# Patient Record
Sex: MALE | Race: WHITE | NOT HISPANIC OR LATINO | Employment: FULL TIME | URBAN - METROPOLITAN AREA
[De-identification: names, ages, dates, MRNs, and addresses within clinical notes are randomized per-mention and may not be internally consistent; named-entity substitution may affect disease eponyms.]

---

## 2017-09-12 ENCOUNTER — OFFICE VISIT (OUTPATIENT)
Dept: URGENT CARE | Facility: URGENT CARE | Age: 32
End: 2017-09-12
Payer: COMMERCIAL

## 2017-09-12 VITALS
TEMPERATURE: 98.7 F | OXYGEN SATURATION: 96 % | DIASTOLIC BLOOD PRESSURE: 90 MMHG | WEIGHT: 247.5 LBS | HEART RATE: 76 BPM | BODY MASS INDEX: 34.52 KG/M2 | SYSTOLIC BLOOD PRESSURE: 120 MMHG

## 2017-09-12 DIAGNOSIS — R05.9 COUGH: ICD-10-CM

## 2017-09-12 DIAGNOSIS — J20.9 ACUTE BRONCHITIS WITH COEXISTING CONDITION REQUIRING PROPHYLACTIC TREATMENT: Primary | ICD-10-CM

## 2017-09-12 DIAGNOSIS — R09.89 CHEST CONGESTION: ICD-10-CM

## 2017-09-12 PROCEDURE — 99214 OFFICE O/P EST MOD 30 MIN: CPT | Performed by: PHYSICIAN ASSISTANT

## 2017-09-12 RX ORDER — CODEINE PHOSPHATE AND GUAIFENESIN 10; 100 MG/5ML; MG/5ML
5-10 SOLUTION ORAL
Qty: 120 ML | Refills: 0 | Status: SHIPPED | OUTPATIENT
Start: 2017-09-12 | End: 2018-08-20

## 2017-09-12 RX ORDER — AZITHROMYCIN 250 MG/1
TABLET, FILM COATED ORAL
Qty: 6 TABLET | Refills: 0 | Status: SHIPPED | OUTPATIENT
Start: 2017-09-12 | End: 2018-08-20

## 2017-09-12 NOTE — PROGRESS NOTES
SUBJECTIVE:   Timothy Castellanos is a 31 year old male presenting with a chief complaint of chest congestion, productive cough.  Onset of symptoms was 5 day(s) ago.  Course of illness is worsening.    Severity moderate  Current and Associated symptoms: productive cough, chest congestion  Treatment measures tried include OTC meds.  Predisposing factors include recent illness, worsening.    Past Medical History:   Diagnosis Date     C. difficile diarrhea  Feb 2016     Esophageal stricture 2001, 2007     GERD (gastroesophageal reflux disease)      Obesity (BMI 30-39.9)     BMI 33     Psoriasis        ALLERGIES   No Known Allergies      Social History   Substance Use Topics     Smoking status: Never Smoker     Smokeless tobacco: Never Used     Alcohol use No       ROS:  CONSTITUTIONAL:NEGATIVE for fever, chills, change in weight  INTEGUMENTARY/SKIN: NEGATIVE for worrisome rashes, moles or lesions  ENT/MOUTH: POSITIVE for purulent nasal drainage  RESP:POSITIVE for cough-productive and chest congestion  CV: NEGATIVE for chest pain, palpitations or peripheral edema  GI: NEGATIVE for nausea, abdominal pain, heartburn, or change in bowel habits  MUSCULOSKELETAL: NEGATIVE for significant arthralgias or myalgia  NEURO: NEGATIVE for weakness, dizziness or paresthesias    OBJECTIVE  :/90  Pulse 76  Temp 98.7  F (37.1  C) (Oral)  Wt 247 lb 8 oz (112.3 kg)  SpO2 96%  BMI 34.52 kg/m2  GENERAL APPEARANCE: healthy, alert and no distress  HENT: TM's normal bilaterally and rhinorrhea purulent  NECK: supple, nontender, no lymphadenopathy  RESP: lungs clear to auscultation - no rales, rhonchi or wheezes  CV: regular rates and rhythm, normal S1 S2, no murmur noted  NEURO: Normal strength and tone, sensory exam grossly normal,  normal speech and mentation  SKIN: no suspicious lesions or rashes      ASSESSMENT/PLAN:      ICD-10-CM    1. Acute bronchitis with coexisting condition requiring prophylactic treatment J20.9 azithromycin  (ZITHROMAX) 250 MG tablet     guaiFENesin-codeine (ROBITUSSIN AC) 100-10 MG/5ML SOLN solution   2. Cough R05 guaiFENesin-codeine (ROBITUSSIN AC) 100-10 MG/5ML SOLN solution   3. Chest congestion R09.89 guaiFENesin-codeine (ROBITUSSIN AC) 100-10 MG/5ML SOLN solution       Patient should take lactobacillus and/or take Activia yogurt while on antibiotics to help decrease the risk of Clostridium Difficile  Follow up with PCP as needed  See orders in Epic

## 2017-09-12 NOTE — MR AVS SNAPSHOT
After Visit Summary   9/12/2017    Timothy Castellanos    MRN: [id number]           Patient Information     Date Of Birth          1985        Visit Information        Provider Department      9/12/2017 9:10 AM Bc Burns PA-C [facility]        Today's Diagnoses     Acute bronchitis with coexisting condition requiring prophylactic treatment    -  1    Cough        Chest congestion           Follow-ups after your visit        Who to contact     If you have questions or need follow up information about today's clinic visit or your schedule please contact [facility] directly at [phone].  Normal or non-critical lab and imaging results will be communicated to you by [employer], letter or phone within 4 business days after the clinic has received the results. If you do not hear from us within 7 days, please contact the clinic through [employer] or phone. If you have a critical or abnormal lab result, we will notify you by phone as soon as possible.  Submit refill requests through [employer] or call your pharmacy and they will forward the refill request to us. Please allow 3 business days for your refill to be completed.          Additional Information About Your Visit        MyChart Information     [employer] gives you secure access to your electronic health record. If you see a primary care provider, you can also send messages to your care team and make appointments. If you have questions, please call your primary care clinic.  If you do not have a primary care provider, please call [phone] and they will assist you.        Care EveryWhere ID     This is your Care EveryWhere ID. This could be used by other organizations to access your [facility] medical records  [id number]        Your Vitals Were     Pulse Temperature Pulse Oximetry BMI (Body Mass Index)          76 98.7  F (37.1  C) (Oral) 96% 34.52 kg/m2         Blood Pressure from Last 3  Encounters:   09/12/17 120/90   03/11/16 128/86   02/12/16 120/72    Weight from Last 3 Encounters:   09/12/17 247 lb 8 oz (112.3 kg)   03/11/16 246 lb 1.6 oz (111.6 kg)   02/12/16 242 lb (109.8 kg)              Today, you had the following     No orders found for display         Today's Medication Changes          These changes are accurate as of: 9/12/17 11:11 AM.  If you have any questions, ask your nurse or doctor.               Start taking these medicines.        Dose/Directions    azithromycin 250 MG tablet   Commonly known as:  ZITHROMAX   Used for:  Acute bronchitis with coexisting condition requiring prophylactic treatment   Started by:  Bc Burns PA-C        2 tabs po qd day 1, then 1 tab po qd days 2-5   Quantity:  6 tablet   Refills:  0       guaiFENesin-codeine 100-10 MG/5ML Soln solution   Commonly known as:  ROBITUSSIN AC   Used for:  Acute bronchitis with coexisting condition requiring prophylactic treatment, Cough, Chest congestion   Started by:  Bc Burns PA-C        Dose:  5-10 mL   Take 5-10 mLs by mouth nightly as needed for cough   Quantity:  120 mL   Refills:  0            Where to get your medicines      These medications were sent to [facility] [address] [place] [address] AVE AT [place] [id number] [facility] [address]     Phone:  [phone]     azithromycin 250 MG tablet         Some of these will need a paper prescription and others can be bought over the counter.  Ask your nurse if you have questions.     Bring a paper prescription for each of these medications     guaiFENesin-codeine 100-10 MG/5ML Soln solution                Primary Care Provider Office Phone # Fax #    Prakash Castellanos [phone]       [address] [place] [address]        Equal Access to Services     ROSELINE HENRY AH: Dorothea Lambert, amarilis mabry, gordo fink  shannan smithminhdannielle pulliamAutumnaarochelle ah. So [facility] [phone].    ATENCIÓN: Si habla peter, tiene a swan disposición servicios gratuitos de asistencia lingüística. Nedra al [phone].    We comply with applicable federal civil rights laws and [place] laws. We do not discriminate on the basis of race, color, national origin, age, disability sex, sexual orientation or gender identity.            Thank you!     Thank you for choosing [facility]  for your care. Our goal is always to provide you with excellent care. Hearing back from our patients is one way we can continue to improve our services. Please take a few minutes to complete the written survey that you may receive in the mail after your visit with us. Thank you!             Your Updated Medication List - Protect others around you: Learn how to safely use, store and throw away your medicines at www.disposemymeds.org.          This list is accurate as of: 9/12/17 11:11 AM.  Always use your most recent med list.                   Brand Name Dispense Instructions for use Diagnosis    azithromycin 250 MG tablet    ZITHROMAX    6 tablet    2 tabs po qd day 1, then 1 tab po qd days 2-5    Acute bronchitis with coexisting condition requiring prophylactic treatment       guaiFENesin-codeine 100-10 MG/5ML Soln solution    ROBITUSSIN AC    120 mL    Take 5-10 mLs by mouth nightly as needed for cough    Acute bronchitis with coexisting condition requiring prophylactic treatment, Cough, Chest congestion

## 2017-09-12 NOTE — NURSING NOTE
"Chief Complaint   Patient presents with     Urgent Care     pt states fatigue, headaches, fever, body aches  4x days        Initial /90  Pulse 76  Temp 98.7  F (37.1  C) (Oral)  Wt 247 lb 8 oz (112.3 kg)  SpO2 96%  BMI 34.52 kg/m2 Estimated body mass index is 34.52 kg/(m^2) as calculated from the following:    Height as of 2/12/16: 5' 11\" (1.803 m).    Weight as of this encounter: 247 lb 8 oz (112.3 kg).  Medication Reconciliation: complete    "

## 2018-08-20 ENCOUNTER — OFFICE VISIT (OUTPATIENT)
Dept: INTERNAL MEDICINE | Facility: CLINIC | Age: 33
End: 2018-08-20
Payer: COMMERCIAL

## 2018-08-20 VITALS
HEIGHT: 72 IN | RESPIRATION RATE: 14 BRPM | BODY MASS INDEX: 32.05 KG/M2 | WEIGHT: 236.6 LBS | OXYGEN SATURATION: 99 % | SYSTOLIC BLOOD PRESSURE: 124 MMHG | TEMPERATURE: 98.6 F | HEART RATE: 69 BPM | DIASTOLIC BLOOD PRESSURE: 72 MMHG

## 2018-08-20 DIAGNOSIS — Z13.6 CARDIOVASCULAR SCREENING; LDL GOAL LESS THAN 160: ICD-10-CM

## 2018-08-20 DIAGNOSIS — Z00.00 ROUTINE GENERAL MEDICAL EXAMINATION AT A HEALTH CARE FACILITY: Primary | ICD-10-CM

## 2018-08-20 DIAGNOSIS — E66.9 OBESITY (BMI 30-39.9): ICD-10-CM

## 2018-08-20 PROCEDURE — 99395 PREV VISIT EST AGE 18-39: CPT | Performed by: INTERNAL MEDICINE

## 2018-08-20 NOTE — MR AVS SNAPSHOT
"              After Visit Summary   8/20/2018    Timothy Castellanos    MRN: [id number]           Patient Information     Date Of Birth          1985        Visit Information        Provider Department      8/20/2018 3:00 PM Prakash Castellanos MD [facility]        Today's Diagnoses     Routine general medical examination at a health care facility    -  1    Obesity (BMI 30-39.9)        CARDIOVASCULAR SCREENING; LDL GOAL LESS THAN 160          Care Instructions    * Return for a \"lab only appointment\" in the next couple of weeks.  Please get these labs done in a fasting state with nothing to eat for at least 8 hours prior to getting labs drawn.  I will make contact regarding the results and any recommendations once I have reviewed them.       5 GOALS TO PREVENT VASCULAR DISEASE:     1.  Aggressive blood pressure control, under 130/80 ideally.  Using medications if needed.    Your blood pressure is under good control    BP Readings from Last 4 Encounters:   08/20/18 124/72   09/12/17 120/90   03/11/16 128/86   02/12/16 120/72       2.  Aggressive LDL cholesterol (\"bad cholesterol\") lowering as indicated.    Your goal is an LDL under 130 for sure, preferably under 100.  (If you have diabetes or previous vascular disease, the the LDL goals would be under 100 for sure, preferably under 70.)    New guidelines identify four high-risk groups who could benefit from statins:   *people with pre-existing heart disease, such as those who have had a heart attack;   *people ages 40 to 75 who have diabetes of any type  *patients ages 40 to 75 with at least a 7.5% risk of developing cardiovascular disease over the next decade, according to a formula described in the guidelines  *patients with the sort of super-high cholesterol that sometimes runs in families, as evidenced by an LDL of 190 milligrams per deciliter or higher    Your cholesterol levels are well controlled.    Recent Labs   Lab Test  " "11/18/14   0853  08/14/13   0755   CHOL  197  211*   HDL  44  43   LDL  135*  152*   TRIG  89  78   CHOLHDLRATIO  4.5  4.9       3.  Aggressive diabetic prevention, screening and/or management.      You do not have diabetes as of the most recent blood tests.     4.  No smoking    5.  Consider taking low dose aspirin (81 mg) tablet once per day over the age of 50.           Preventive Health Recommendations  Male Ages 26 - 39    Yearly exam:             See your health care provider every year in order to  o   Review health changes.   o   Discuss preventive care.    o   Review your medicines if your doctor has prescribed any.    You should be tested each year for STDs (sexually transmitted diseases), if you re at risk.     After age 35, talk to your provider about cholesterol testing. If you are at risk for heart disease, have your cholesterol tested at least every 5 years.     If you are at risk for diabetes, you should have a diabetes test (fasting glucose).  Shots: Get a flu shot each year. Get a tetanus shot every 10 years.     Nutrition:    Eat at least 5 servings of fruits and vegetables daily.     Eat whole-grain bread, whole-wheat pasta and brown rice instead of white grains and rice.     Get adequate Calcium and Vitamin D.        --Good Grains:  Oats, brown rice, Quinoa (these do not raise the blood sugar as much)     --Bad grains:  Anything made from wheat or white rice     (because these raise the blood sugars significantly, and the possible gluten issue from wheat for some people).      --Proteins:  Aim for \"lean proteins\" including chicken, fish, seafood, pork, turkey, and eggs (in moderation); Eat red meat only occasionally          Se Ferro:                         Lifestyle    Exercise for at least 150 minutes a week (30 minutes a day, 5 days a week). This will help you control your weight and prevent disease.     Limit alcohol to one drink per day.     No smoking.     Wear sunscreen to prevent " "skin cancer.     See your dentist every six months for an exam and cleaning.             Follow-ups after your visit        Future tests that were ordered for you today     Open Future Orders        Priority Expected Expires Ordered    Lipid panel reflex to direct LDL Fasting Routine 9/3/2018 10/20/2018 8/20/2018    **CBC with platelets FUTURE 2mo Routine 9/3/2018 10/20/2018 8/20/2018    **Basic metabolic panel FUTURE 2mo Routine 9/3/2018 10/20/2018 8/20/2018            Who to contact     If you have questions or need follow up information about today's clinic visit or your schedule please contact [facility] directly at [phone].  Normal or non-critical lab and imaging results will be communicated to you by "[employer]hart, letter or phone within 4 business days after the clinic has received the results. If you do not hear from us within 7 days, please contact the clinic through "[employer]hart or phone. If you have a critical or abnormal lab result, we will notify you by phone as soon as possible.  Submit refill requests through [employer] or call your pharmacy and they will forward the refill request to us. Please allow 3 business days for your refill to be completed.          Additional Information About Your Visit        "[employer]harDue[employer]il Information     [employer] gives you secure access to your electronic health record. If you see a primary care provider, you can also send messages to your care team and make appointments. If you have questions, please call your primary care clinic.  If you do not have a primary care provider, please call [phone] and they will assist you.        Care EveryWhere ID     This is your Care EveryWhere ID. This could be used by other organizations to access your [facility] medical records  [id number]        Your Vitals Were     Pulse Temperature Respirations Height Pulse Oximetry BMI (Body Mass Index)    69 98.6  F (37  C) (Oral) 14 5' 11.5\" (1.816 m) 99% 32.54 kg/m2       Blood " Pressure from Last 3 Encounters:   08/20/18 124/72   09/12/17 120/90   03/11/16 128/86    Weight from Last 3 Encounters:   08/20/18 236 lb 9.6 oz (107.3 kg)   09/12/17 247 lb 8 oz (112.3 kg)   03/11/16 246 lb 1.6 oz (111.6 kg)               Primary Care Provider Office Phone # Fax #    Prakash Castellanos [phone]       [address] [place] [address]        Equal Access to Services     [facility]: Hadii aad ku hadasho Soomaali, waaxda luqadaha, qaybta kaalmada adeegyada, gordo kennedy . So [facility] [phone].    ATENCIÓN: Si habla español, tiene a swan disposición servicios gratuitos de asistencia lingüística. [facility] [phone].    We comply with applicable federal civil rights laws and [place] laws. We do not discriminate on the basis of race, color, national origin, age, disability, sex, sexual orientation, or gender identity.            Thank you!     Thank you for choosing [facility]  for your care. Our goal is always to provide you with excellent care. Hearing back from our patients is one way we can continue to improve our services. Please take a few minutes to complete the written survey that you may receive in the mail after your visit with us. Thank you!             Your Updated Medication List - Protect others around you: Learn how to safely use, store and throw away your medicines at www.disposemymeds.org.      Notice  As of 8/20/2018  3:47 PM    You have not been prescribed any medications.

## 2018-08-20 NOTE — PATIENT INSTRUCTIONS
"* Return for a \"lab only appointment\" in the next couple of weeks.  Please get these labs done in a fasting state with nothing to eat for at least 8 hours prior to getting labs drawn.  I will make contact regarding the results and any recommendations once I have reviewed them.       5 GOALS TO PREVENT VASCULAR DISEASE:     1.  Aggressive blood pressure control, under 130/80 ideally.  Using medications if needed.    Your blood pressure is under good control    BP Readings from Last 4 Encounters:   08/20/18 124/72   09/12/17 120/90   03/11/16 128/86   02/12/16 120/72       2.  Aggressive LDL cholesterol (\"bad cholesterol\") lowering as indicated.    Your goal is an LDL under 130 for sure, preferably under 100.  (If you have diabetes or previous vascular disease, the the LDL goals would be under 100 for sure, preferably under 70.)    New guidelines identify four high-risk groups who could benefit from statins:   *people with pre-existing heart disease, such as those who have had a heart attack;   *people ages 40 to 75 who have diabetes of any type  *patients ages 40 to 75 with at least a 7.5% risk of developing cardiovascular disease over the next decade, according to a formula described in the guidelines  *patients with the sort of super-high cholesterol that sometimes runs in families, as evidenced by an LDL of 190 milligrams per deciliter or higher    Your cholesterol levels are well controlled.    Recent Labs   Lab Test  11/18/14   0853  08/14/13   0755   CHOL  197  211*   HDL  44  43   LDL  135*  152*   TRIG  89  78   CHOLHDLRATIO  4.5  4.9       3.  Aggressive diabetic prevention, screening and/or management.      You do not have diabetes as of the most recent blood tests.     4.  No smoking    5.  Consider taking low dose aspirin (81 mg) tablet once per day over the age of 50.           Preventive Health Recommendations  Male Ages 26 - 39    Yearly exam:             See your health care provider every year in " "order to  o   Review health changes.   o   Discuss preventive care.    o   Review your medicines if your doctor has prescribed any.    You should be tested each year for STDs (sexually transmitted diseases), if you re at risk.     After age 35, talk to your provider about cholesterol testing. If you are at risk for heart disease, have your cholesterol tested at least every 5 years.     If you are at risk for diabetes, you should have a diabetes test (fasting glucose).  Shots: Get a flu shot each year. Get a tetanus shot every 10 years.     Nutrition:    Eat at least 5 servings of fruits and vegetables daily.     Eat whole-grain bread, whole-wheat pasta and brown rice instead of white grains and rice.     Get adequate Calcium and Vitamin D.        --Good Grains:  Oats, brown rice, Quinoa (these do not raise the blood sugar as much)     --Bad grains:  Anything made from wheat or white rice     (because these raise the blood sugars significantly, and the possible gluten issue from wheat for some people).      --Proteins:  Aim for \"lean proteins\" including chicken, fish, seafood, pork, turkey, and eggs (in moderation); Eat red meat only occasionally          Se Ferro:                         Lifestyle    Exercise for at least 150 minutes a week (30 minutes a day, 5 days a week). This will help you control your weight and prevent disease.     Limit alcohol to one drink per day.     No smoking.     Wear sunscreen to prevent skin cancer.     See your dentist every six months for an exam and cleaning.     "

## 2018-08-20 NOTE — PROGRESS NOTES
SUBJECTIVE:   CC: Timothy Castellanos is an 32 year old male who presents for preventative health visit.     Physical   Annual:     Getting at least 3 servings of Calcium per day:  Yes    Bi-annual eye exam:  Yes    Dental care twice a year:  NO    Sleep apnea or symptoms of sleep apnea:  Excessive snoring    Diet:  Regular (no restrictions)    Frequency of exercise:  1 day/week    Duration of exercise:  15-30 minutes    Taking medications regularly:  Yes    Medication side effects:  None    Additional concerns today:  YES (c/o acute aching pain in L shoulder/chest area that lasts for a few seconds then goes away. Pain can occur multiple tiems a day, typically when he is resting/laying down. Denies sweating and nausea. Pain does raditate down under arm)      Today's PHQ-2 Score:   PHQ-2 ( 1999 Pfizer) 8/20/2018   Q1: Little interest or pleasure in doing things 0   Q2: Feeling down, depressed or hopeless 0   PHQ-2 Score 0   Q1: Little interest or pleasure in doing things Not at all   Q2: Feeling down, depressed or hopeless Not at all   PHQ-2 Score 0       Abuse: Current or Past(Physical, Sexual or Emotional)- No  Do you feel safe in your environment - Yes    Social History   Substance Use Topics     Smoking status: Never Smoker     Smokeless tobacco: Never Used     Alcohol use No     Alcohol Use 8/20/2018   If you drink alcohol do you typically have greater than 3 drinks per day OR greater than 7 drinks per week? No       Last PSA: No results found for: PSA    Reviewed orders with patient. Reviewed health maintenance and updated orders accordingly - Yes      Reviewed and updated as needed this visit by clinical staff  Tobacco  Allergies  Meds         Reviewed and updated as needed this visit by Provider          Past Medical History:  ---------------------------  Past Medical History:   Diagnosis Date     C. difficile diarrhea  Feb 2016     Esophageal stricture 2001, 2007     GERD (gastroesophageal reflux disease)       Obesity (BMI 30-39.9)     BMI 33     Psoriasis        Past Surgical History:  ---------------------------  Past Surgical History:   Procedure Laterality Date     DENTAL SURGERY  2002    4 wisdom teeth removed without complications       Current Medications:  ---------------------------  No current outpatient prescriptions on file.       Allergies:  -------------  No Known Allergies    Social History:  -------------------  Social History     Social History     Marital status:      Spouse name: N/A     Number of children: N/A     Years of education: N/A     Occupational History     [employer]     Social History Main Topics     Smoking status: Never Smoker     Smokeless tobacco: Never Used     Alcohol use No     Drug use: No     Sexual activity: Yes     Partners: Female     Birth control/ protection: Surgical      Comment: Wife has tubes tied     Other Topics Concern     Not on file     Social History Narrative       Family Medical History:  ------------------------------  Family History   Problem Relation Age of Onset     Family History Negative Mother      Respiratory Father      sleep apnea     Family History Negative Brother      Family History Negative Brother         Review of Systems  CONSTITUTIONAL: NEGATIVE for fever, chills, change in weight  INTEGUMENTARY/SKIN: NEGATIVE for worrisome rashes, moles or lesions  EYES: NEGATIVE for vision changes or irritation  ENT: NEGATIVE for ear, mouth and throat problems  RESP: NEGATIVE for significant cough or SOB  CV: NEGATIVE for chest pain, palpitations or peripheral edema  GI: NEGATIVE for nausea, abdominal pain, heartburn, or change in bowel habits   male: negative for dysuria, hematuria, decreased urinary stream, erectile dysfunction, urethral discharge  MUSCULOSKELETAL: NEGATIVE for significant arthralgias or myalgia  NEURO: NEGATIVE for weakness, dizziness or paresthesias  PSYCHIATRIC: NEGATIVE for changes in mood or  "affect    OBJECTIVE:   /72  Pulse 69  Temp 98.6  F (37  C) (Oral)  Resp 14  Ht 5' 11.5\" (1.816 m)  Wt 236 lb 9.6 oz (107.3 kg)  SpO2 99%  BMI 32.54 kg/m2    Physical Exam    GENERAL alert and no distress.  EYES conjunctivae/corneas clear. PERRL, EOM's intact  HENT: NCAT,oral and posterior pharynx without lesions or erythema, facies symmetric  NECK: Neck supple. No LAD, without thyroidmegaly or JVD.  RESP: Clear to ausculation bilaterally without wheezes or crackles. Normal BS in all fields.  CV: RRR normal S1S2 without  murmurs, rubs or gallops. PMI normal  LYMPH: no cervical lymph adenopathy appreciated  GI: NTND, no organomegaly, normal BS in all quadrants, without rebound or guarding  MS: No cyanosis, clubbing or edema noted bilaterally in Upper and/or Lower Extremities  SKIN: no significant ulcers, lesions or rashes on the visualized portions of the skin  NEURO: Alert and Oriented x 3, Gait normal. Reflexes normal and symmetric. Sensation grossly WNL..  PSYCH: Alert and oriented times 3; speech- coherent , normal rate and volume; able to articulate logical thoughts, able to abstract reason, no tangential thoughts, no hallucinations or delusions, affect- normal       ASSESSMENT/PLAN:       (Z00.00) Routine general medical examination at a health care facility  (primary encounter diagnosis)  Comment: Discussed cardiac disease risk factor modification including screening for and treating HTN, lipids, DM, and smoking cessation.  Also discussed age appropriate cancer screening recommendations including testicular, prostate, colon and lung cancer as dictated by age group.  Recommended low fat, low salt diet and moderation in any alcohol intake.  Recommended always using seatbelts when in a car.  Recommended never driving after drinking or riding with someone who has been drinking as well.       Plan: Lipid panel reflex to direct LDL Fasting, **CBC        with platelets FUTURE 2mo, **Basic metabolic        "  panel FUTURE 2mo            (E66.9) Obesity (BMI 30-39.9)  Comment: The patient's current BMI is: Body mass index is 32.54 kg/(m^2).  Obesity is a biological preventable and treatable disease, which is associated with significantly increased risk of many acute and chronic health conditions. Obesity has now been recognized as a chronic disease by the American Medical Association.  A range of serious co-morbidities are associated with obesity including increased risk for hypertension, stroke, coronary artery disease, dyslipidemia, Type II diabetes, depression, sleep apnea, cancers of the colon, breast and endometrium, obstructive sleep apnea, osteoarthritis and female infertility. Therefore, obesity is not just a problem; it s a disease that warrants serious evidence based treatements.  Recommended regular aerobic exercise, recommended improved diet aiming at lowering amount of processed foods, lower sugars, and lower wheat products, and moderation carbs and fat in the diet, establishing more regular meal times, always eating breakfast, front loading some of the calories and adding more protein to the diet.    Discussed the increased risks of developing or worsening all types of diabetes and other dysmetabolic syndromes.    Surgical therapy may be considered, especially in patients with BMI greater than or equal to 35 kg/m2 with multiple complications. Surgical treatments include lap-band, gastric sleeve or gastric bypass surgery.     Plan: Lipid panel reflex to direct LDL Fasting, **CBC        with platelets FUTURE 2mo, **Basic metabolic         panel FUTURE 2mo            (Z13.6) CARDIOVASCULAR SCREENING; LDL GOAL LESS THAN 160  Comment: Discussed cardiac disease risk factors and cardiac disease risk factor modification.   Plan: Lipid panel reflex to direct LDL Fasting, **CBC        with platelets FUTURE 2mo, **Basic metabolic         panel FUTURE 2mo               COUNSELING:   Reviewed preventive health  "counseling, as reflected in patient instructions       Regular exercise       Healthy diet/nutrition       Vision screening       Hearing screening    BP Readings from Last 1 Encounters:   08/20/18 124/72     Estimated body mass index is 32.54 kg/(m^2) as calculated from the following:    Height as of this encounter: 5' 11.5\" (1.816 m).    Weight as of this encounter: 236 lb 9.6 oz (107.3 kg).           reports that he has never smoked. He has never used smokeless tobacco.      Counseling Resources:  ATP IV Guidelines  Pooled Cohorts Equation Calculator  FRAX Risk Assessment  ICSI Preventive Guidelines  Dietary Guidelines for Americans, 2010  USDA's MyPlate  ASA Prophylaxis  Lung CA Screening    Prakash Castellanos MD  [facility]  Answers for HPI/ROS submitted by the patient on 8/20/2018   PHQ-2 Score: 0    "

## 2018-08-20 NOTE — PROGRESS NOTES
"  SUBJECTIVE:   CC: Timothy Castellanos is an 32 year old male who presents for preventative health visit.     Healthy Habits:    Do you get at least three servings of calcium containing foods daily (dairy, green leafy vegetables, etc.)? {YES/NO, DAIRY INTAKE:327411::\"yes\"}    Amount of exercise or daily activities, outside of work: {AMOUNT EXERCISE:884853}    Problems taking medications regularly {Yes /No default:639596::\"No\"}    Medication side effects: {Yes /No default.:018314::\"No\"}    Have you had an eye exam in the past two years? {YESNOBLANK:543732}    Do you see a dentist twice per year? {YESNOBLANK:985044}    Do you have sleep apnea, excessive snoring or daytime drowsiness?{YESNOBLANK:098758}  {Outside tests to abstract? :312330}     {additional problems to add (Optional):588932}    Today's PHQ-2 Score:   PHQ-2 ( 1999 Pfizer) 8/20/2018 3/11/2016   Q1: Little interest or pleasure in doing things 0 0   Q2: Feeling down, depressed or hopeless 0 0   PHQ-2 Score 0 0   Q1: Little interest or pleasure in doing things Not at all -   Q2: Feeling down, depressed or hopeless Not at all -   PHQ-2 Score 0 -     {PHQ-2 LOOK IN ASSESSMENTS :344227}  Abuse: Current or Past(Physical, Sexual or Emotional)- {YES/NO/NA:074987}  Do you feel safe in your environment - {YES/NO/NA:005808}    Social History   Substance Use Topics     Smoking status: Never Smoker     Smokeless tobacco: Never Used     Alcohol use No      If you drink alcohol do you typically have >3 drinks per day or >7 drinks per week? {ETOH :045678}                      Last PSA: No results found for: PSA    Reviewed orders with patient. Reviewed health maintenance and updated orders accordingly - {Yes/No:148987::\"Yes\"}  {Chronicprobdata (Optional):542988}    Reviewed and updated as needed this visit by clinical staff         Reviewed and updated as needed this visit by Provider        {HISTORY OPTIONS (Optional):502506}    ROS:  { :722947::\"CONSTITUTIONAL: NEGATIVE for " "fever, chills, change in weight\",\"INTEGUMENTARY/SKIN: NEGATIVE for worrisome rashes, moles or lesions\",\"EYES: NEGATIVE for vision changes or irritation\",\"ENT: NEGATIVE for ear, mouth and throat problems\",\"RESP: NEGATIVE for significant cough or SOB\",\"CV: NEGATIVE for chest pain, palpitations or peripheral edema\",\"GI: NEGATIVE for nausea, abdominal pain, heartburn, or change in bowel habits\",\" male: negative for dysuria, hematuria, decreased urinary stream, erectile dysfunction, urethral discharge\",\"MUSCULOSKELETAL: NEGATIVE for significant arthralgias or myalgia\",\"NEURO: NEGATIVE for weakness, dizziness or paresthesias\",\"PSYCHIATRIC: NEGATIVE for changes in mood or affect\"}    OBJECTIVE:   There were no vitals taken for this visit.  EXAM:  {Exam Choices:030986}    {Diagnostic Test Results (Optional):807613::\"Diagnostic Test Results:\",\"none \"}    ASSESSMENT/PLAN:   {Diag Picklist:247410}    COUNSELING:  {MALE COUNSELING MESSAGES:189320::\"Reviewed preventive health counseling, as reflected in patient instructions\"}    BP Readings from Last 1 Encounters:   09/12/17 120/90     Estimated body mass index is 34.52 kg/(m^2) as calculated from the following:    Height as of 2/12/16: 5' 11\" (1.803 m).    Weight as of 9/12/17: 247 lb 8 oz (112.3 kg).    {BP Counseling- Complete if BP >= 120/80  (Optional):904527}  {Weight Management Plan (ACO) Complete if BMI is abnormal-  Ages 18-64  BMI >24.9.  Age 65+ with BMI <23 or >30 (Optional):780979}     reports that he has never smoked. He has never used smokeless tobacco.  {Tobacco Cessation -- Complete if patient is a smoker (Optional):799046}    Counseling Resources:  ATP IV Guidelines  Pooled Cohorts Equation Calculator  FRAX Risk Assessment  ICSI Preventive Guidelines  Dietary Guidelines for Americans, 2010  USDA's MyPlate  ASA Prophylaxis  Lung CA Screening    Prakash Castellanos MD  [facility]  "

## 2018-08-21 DIAGNOSIS — Z13.6 CARDIOVASCULAR SCREENING; LDL GOAL LESS THAN 160: ICD-10-CM

## 2018-08-21 DIAGNOSIS — Z00.00 ROUTINE GENERAL MEDICAL EXAMINATION AT A HEALTH CARE FACILITY: ICD-10-CM

## 2018-08-21 DIAGNOSIS — E66.9 OBESITY (BMI 30-39.9): ICD-10-CM

## 2018-08-21 LAB
ANION GAP SERPL CALCULATED.3IONS-SCNC: 5 MMOL/L (ref 3–14)
BUN SERPL-MCNC: 9 MG/DL (ref 7–30)
CALCIUM SERPL-MCNC: 8.4 MG/DL (ref 8.5–10.1)
CHLORIDE SERPL-SCNC: 107 MMOL/L (ref 94–109)
CHOLEST SERPL-MCNC: 180 MG/DL
CO2 SERPL-SCNC: 29 MMOL/L (ref 20–32)
CREAT SERPL-MCNC: 0.96 MG/DL (ref 0.66–1.25)
ERYTHROCYTE [DISTWIDTH] IN BLOOD BY AUTOMATED COUNT: 12.6 % (ref 10–15)
GFR SERPL CREATININE-BSD FRML MDRD: 89 ML/MIN/1.7M2
GLUCOSE SERPL-MCNC: 86 MG/DL (ref 70–99)
HCT VFR BLD AUTO: 47.1 % (ref 40–53)
HDLC SERPL-MCNC: 41 MG/DL
HGB BLD-MCNC: 16.3 G/DL (ref 13.3–17.7)
LDLC SERPL CALC-MCNC: 123 MG/DL
MCH RBC QN AUTO: 29.5 PG (ref 26.5–33)
MCHC RBC AUTO-ENTMCNC: 34.6 G/DL (ref 31.5–36.5)
MCV RBC AUTO: 85 FL (ref 78–100)
NONHDLC SERPL-MCNC: 139 MG/DL
PLATELET # BLD AUTO: 245 10E9/L (ref 150–450)
POTASSIUM SERPL-SCNC: 4.2 MMOL/L (ref 3.4–5.3)
RBC # BLD AUTO: 5.53 10E12/L (ref 4.4–5.9)
SODIUM SERPL-SCNC: 141 MMOL/L (ref 133–144)
TRIGL SERPL-MCNC: 81 MG/DL
WBC # BLD AUTO: 6.9 10E9/L (ref 4–11)

## 2018-08-21 PROCEDURE — 80048 BASIC METABOLIC PNL TOTAL CA: CPT | Performed by: INTERNAL MEDICINE

## 2018-08-21 PROCEDURE — 36415 COLL VENOUS BLD VENIPUNCTURE: CPT | Performed by: INTERNAL MEDICINE

## 2018-08-21 PROCEDURE — 85027 COMPLETE CBC AUTOMATED: CPT | Performed by: INTERNAL MEDICINE

## 2018-08-21 PROCEDURE — 80061 LIPID PANEL: CPT | Performed by: INTERNAL MEDICINE

## 2019-05-01 ENCOUNTER — E-VISIT (OUTPATIENT)
Dept: INTERNAL MEDICINE | Facility: CLINIC | Age: 34
End: 2019-05-01
Payer: COMMERCIAL

## 2019-05-01 DIAGNOSIS — J06.9 VIRAL URI WITH COUGH: Primary | ICD-10-CM

## 2019-05-01 PROCEDURE — 99444 ZZC PHYSICIAN ONLINE EVALUATION & MANAGEMENT SERVICE: CPT | Performed by: INTERNAL MEDICINE

## 2019-05-01 NOTE — PATIENT INSTRUCTIONS
Viral Upper Respiratory Illness (Adult)    You have a viral upper respiratory illness (URI), which is another term for the common cold. This illness is contagious during the first few days. It is spread through the air by coughing and sneezing. It may also be spread by direct contact (touching the sick person and then touching your own eyes, nose, or mouth). Frequent handwashing will decrease risk of spread. Most viral illnesses go away within 7 to 10 days with rest and simple home remedies. Sometimes the illness may last for several weeks. Antibiotics will not kill a virus, and they are generally not prescribed for this condition.  Home care    If symptoms are severe, rest at home for the first 2 to 3 days. When you resume activity, don't let yourself get too tired.    Don't smoke. If you need help stopping, talk with your healthcare provider.    Avoid being exposed to cigarette smoke (yours or others ).    You may use acetaminophen or ibuprofen to control pain and fever, unless another medicine was prescribed. If you have chronic liver or kidney disease, have ever had a stomach ulcer or gastrointestinal bleeding, or are taking blood-thinning medicines, talk with your healthcare provider before using these medicines. Aspirin should never be given to anyone under 18 years of age who is ill with a viral infection or fever. It may cause severe liver or brain damage.    Your appetite may be poor, so a light diet is fine. Stay well hydrated by drinking 6 to 8 glasses of fluids per day (water, soft drinks, juices, tea, or soup). Extra fluids will help loosen secretions in the nose and lungs.    Over-the-counter cold medicines will not shorten the length of time you re sick, but they may be helpful for the following symptoms: cough, sore throat, and nasal and sinus congestion. If you take prescription medicines, ask your healthcare provider or pharmacist which over-the-counter medicines are safe to use. (Note: Don't  use decongestants if you have high blood pressure.)  Follow-up care  Follow up with your healthcare provider, or as advised.  When to seek medical advice  Call your healthcare provider right away if any of these occur:    Cough with lots of colored sputum (mucus)    Severe headache; face, neck, or ear pain    Difficulty swallowing due to throat pain    Fever of 100.4 F (38 C) or higher, or as directed by your healthcare provider  Call 911  Call 911 if any of these occur:    Chest pain, shortness of breath, wheezing, or difficulty breathing    Coughing up blood    Very severe pain with swallowing, especially if it goes along with a muffled voice   Date Last Reviewed: 6/1/2018 2000-2018 [employer]. [address] [place] [address], [place] [address]. All rights reserved. This information is not intended as a substitute for professional medical care. Always follow your healthcare professional's instructions.

## 2020-03-01 ENCOUNTER — HEALTH MAINTENANCE LETTER (OUTPATIENT)
Age: 35
End: 2020-03-01

## 2020-12-14 ENCOUNTER — HEALTH MAINTENANCE LETTER (OUTPATIENT)
Age: 35
End: 2020-12-14

## 2021-04-06 ENCOUNTER — IMMUNIZATION (OUTPATIENT)
Dept: NURSING | Facility: CLINIC | Age: 36
End: 2021-04-06
Payer: COMMERCIAL

## 2021-04-06 PROCEDURE — 91301 PR COVID VAC MODERNA 100 MCG/0.5 ML IM: CPT

## 2021-04-06 PROCEDURE — 0011A PR COVID VAC MODERNA 100 MCG/0.5 ML IM: CPT

## 2021-04-17 ENCOUNTER — HEALTH MAINTENANCE LETTER (OUTPATIENT)
Age: 36
End: 2021-04-17

## 2021-05-04 ENCOUNTER — IMMUNIZATION (OUTPATIENT)
Dept: FAMILY MEDICINE | Facility: CLINIC | Age: 36
End: 2021-05-04
Attending: INTERNAL MEDICINE
Payer: COMMERCIAL

## 2021-05-04 PROCEDURE — 0012A PR COVID VAC MODERNA 100 MCG/0.5 ML IM: CPT

## 2021-05-04 PROCEDURE — 91301 PR COVID VAC MODERNA 100 MCG/0.5 ML IM: CPT

## 2021-10-02 ENCOUNTER — HEALTH MAINTENANCE LETTER (OUTPATIENT)
Age: 36
End: 2021-10-02

## 2021-12-31 ENCOUNTER — IMMUNIZATION (OUTPATIENT)
Dept: FAMILY MEDICINE | Facility: CLINIC | Age: 36
End: 2021-12-31
Payer: COMMERCIAL

## 2021-12-31 DIAGNOSIS — Z23 NEED FOR COVID-19 VACCINE: Primary | ICD-10-CM

## 2021-12-31 PROCEDURE — 99207 PR NO CHARGE NURSE ONLY: CPT

## 2021-12-31 PROCEDURE — 0064A COVID-19,PF,MODERNA (18+ YRS BOOSTER .25ML): CPT

## 2021-12-31 PROCEDURE — 91306 COVID-19,PF,MODERNA (18+ YRS BOOSTER .25ML): CPT

## 2022-05-14 ENCOUNTER — HEALTH MAINTENANCE LETTER (OUTPATIENT)
Age: 37
End: 2022-05-14

## 2022-09-03 ENCOUNTER — HEALTH MAINTENANCE LETTER (OUTPATIENT)
Age: 37
End: 2022-09-03

## 2022-10-31 ENCOUNTER — VIRTUAL VISIT (OUTPATIENT)
Dept: FAMILY MEDICINE | Facility: CLINIC | Age: 37
End: 2022-10-31
Payer: COMMERCIAL

## 2022-10-31 DIAGNOSIS — Z86.19 H/O CLOSTRIDIUM DIFFICILE INFECTION: ICD-10-CM

## 2022-10-31 DIAGNOSIS — R19.7 DIARRHEA, UNSPECIFIED TYPE: Primary | ICD-10-CM

## 2022-10-31 PROCEDURE — 99203 OFFICE O/P NEW LOW 30 MIN: CPT | Mod: 95 | Performed by: PHYSICIAN ASSISTANT

## 2022-10-31 NOTE — PROGRESS NOTES
Timothy is a 37 year old who is being evaluated via a billable video visit.      How would you like to obtain your AVS? MyChart  If the video visit is dropped, the invitation should be resent by: Call: [phone]  Will anyone else be joining your video visit? No      Assessment & Plan       ICD-10-CM    1. Diarrhea, unspecified type  R19.7 C. difficile Toxin B PCR with reflex to C. difficile Antigen and Toxins A/B EIA     Enteric Bacteria and Virus Panel by [facility] Stool     Ova and Parasite Exam Routine      2. H/O Clostridium difficile infection  Z86.19 C. difficile Toxin B PCR with reflex to C. difficile Antigen and Toxins A/B EIA        No red flags on exam. Possibly something food related but given history of C diff and frequency of diarrhea at least initially, should run some stool studies to exclude infectious source. Patient in [facility] - will contact local clinic/lab to get supplies. Orders placed      Return in about 1 week (around 11/7/2022) for If not improving or worsening.    [facility] is a 37 year old, presenting for the following health issues:  Diarrhea      HPI     Diarrhea  Onset/Duration: Friday 10/28  Description:       Consistency of stool: watery       Blood in stool: No       Number of loose stools past 24 hours: 2-3  Progression of Symptoms: improving  Accompanying signs and symptoms:       Fever: No       Nausea/Vomiting: No       Abdominal pain: No       Weight loss: No       Episodes of constipation: No  History   Ill contacts: No  Recent use of antibiotics: No  Recent travels: No  Recent medication-new or changes(Rx or OTC): No  Precipitating or alleviating factors: None  Therapies tried and outcome: None    A lot of the same symptoms he had when he had C dif several years ago (~2106)  Started fairly acutely on Friday - feels like he was going every hour  No blood in the stools  Temp up to 99 but never higher  No abdominal  "cramping  Over the weekend did slow down - only went 2 times yesterday and again today but consistency is still \"not normal\"  No new or different foods on Friday    Review of Systems   Remainder of ROS obtained and found to be negative other than that which was documented above        Objective           Vitals:  No vitals were obtained today due to virtual visit.    Physical Exam   GENERAL: Healthy, alert and no distress  EYES: Eyes grossly normal to inspection.  No discharge or erythema, or obvious scleral/conjunctival abnormalities.  RESP: No audible wheeze, cough, or visible cyanosis.  No visible retractions or increased work of breathing.    SKIN: Visible skin clear. No significant rash, abnormal pigmentation or lesions.  NEURO: Cranial nerves grossly intact.  Mentation and speech appropriate for age.  PSYCH: Mentation appears normal, affect normal/bright, judgement and insight intact, normal speech and appearance well-groomed.    Diagnostic Tests:  Stool studies ordered to be collected      Video-Visit Details    Video Start Time: 1733    Type of service:  Video Visit    Video End Time:1738    Originating Location (pt. Location): Home      Distant Location (provider location):  On-site    Platform used for Video Visit: Charly    "

## 2022-11-04 ENCOUNTER — LAB (OUTPATIENT)
Dept: LAB | Facility: CLINIC | Age: 37
End: 2022-11-04
Payer: COMMERCIAL

## 2022-11-04 DIAGNOSIS — Z86.19 H/O CLOSTRIDIUM DIFFICILE INFECTION: ICD-10-CM

## 2022-11-04 DIAGNOSIS — R19.7 DIARRHEA, UNSPECIFIED TYPE: ICD-10-CM

## 2022-11-05 LAB — C DIFF TOX B STL QL: NEGATIVE

## 2022-11-05 PROCEDURE — 87209 SMEAR COMPLEX STAIN: CPT

## 2022-11-05 PROCEDURE — 87506 IADNA-DNA/RNA PROBE TQ 6-11: CPT

## 2022-11-05 PROCEDURE — 87493 C DIFF AMPLIFIED PROBE: CPT | Mod: 59

## 2022-11-05 PROCEDURE — 87177 OVA AND PARASITES SMEARS: CPT

## 2022-11-07 LAB — O+P STL MICRO: NEGATIVE

## 2023-04-16 ASSESSMENT — ENCOUNTER SYMPTOMS
WEAKNESS: 0
HEMATOCHEZIA: 0
NERVOUS/ANXIOUS: 0
DIARRHEA: 0
SORE THROAT: 0
EYE PAIN: 0
COUGH: 0
NAUSEA: 0
ARTHRALGIAS: 0
SHORTNESS OF BREATH: 0
PARESTHESIAS: 0
HEMATURIA: 0
ABDOMINAL PAIN: 0
HEADACHES: 0
DIZZINESS: 0
HEARTBURN: 0
FREQUENCY: 0
PALPITATIONS: 0
DYSURIA: 0
CONSTIPATION: 0
CHILLS: 0
MYALGIAS: 0
FEVER: 0
JOINT SWELLING: 0

## 2023-04-17 ENCOUNTER — OFFICE VISIT (OUTPATIENT)
Dept: INTERNAL MEDICINE | Facility: CLINIC | Age: 38
End: 2023-04-17
Payer: COMMERCIAL

## 2023-04-17 VITALS
BODY MASS INDEX: 37.69 KG/M2 | HEART RATE: 89 BPM | HEIGHT: 72 IN | DIASTOLIC BLOOD PRESSURE: 86 MMHG | WEIGHT: 278.3 LBS | OXYGEN SATURATION: 98 % | TEMPERATURE: 98.1 F | SYSTOLIC BLOOD PRESSURE: 126 MMHG

## 2023-04-17 DIAGNOSIS — K21.9 GASTROESOPHAGEAL REFLUX DISEASE WITHOUT ESOPHAGITIS: ICD-10-CM

## 2023-04-17 DIAGNOSIS — Z11.4 SCREENING FOR HIV (HUMAN IMMUNODEFICIENCY VIRUS): ICD-10-CM

## 2023-04-17 DIAGNOSIS — Z11.59 NEED FOR HEPATITIS C SCREENING TEST: ICD-10-CM

## 2023-04-17 DIAGNOSIS — E66.01 SEVERE OBESITY (BMI 35.0-35.9 WITH COMORBIDITY) (H): ICD-10-CM

## 2023-04-17 DIAGNOSIS — Z00.00 ROUTINE GENERAL MEDICAL EXAMINATION AT A HEALTH CARE FACILITY: Primary | ICD-10-CM

## 2023-04-17 DIAGNOSIS — G47.33 OBSTRUCTIVE SLEEP APNEA OF ADULT: ICD-10-CM

## 2023-04-17 DIAGNOSIS — L40.9 PSORIASIS: ICD-10-CM

## 2023-04-17 DIAGNOSIS — Z13.6 CARDIOVASCULAR SCREENING; LDL GOAL LESS THAN 160: ICD-10-CM

## 2023-04-17 DIAGNOSIS — Z23 NEED FOR TDAP VACCINATION: ICD-10-CM

## 2023-04-17 LAB
ANION GAP SERPL CALCULATED.3IONS-SCNC: 13 MMOL/L (ref 7–15)
BUN SERPL-MCNC: 9.2 MG/DL (ref 6–20)
CALCIUM SERPL-MCNC: 9.5 MG/DL (ref 8.6–10)
CHLORIDE SERPL-SCNC: 103 MMOL/L (ref 98–107)
CHOLEST SERPL-MCNC: 252 MG/DL
CREAT SERPL-MCNC: 1 MG/DL (ref 0.67–1.17)
DEPRECATED HCO3 PLAS-SCNC: 23 MMOL/L (ref 22–29)
ERYTHROCYTE [DISTWIDTH] IN BLOOD BY AUTOMATED COUNT: 12.7 % (ref 10–15)
GFR SERPL CREATININE-BSD FRML MDRD: >90 ML/MIN/1.73M2
GLUCOSE SERPL-MCNC: 95 MG/DL (ref 70–99)
HCT VFR BLD AUTO: 48.2 % (ref 40–53)
HCV AB SERPL QL IA: NONREACTIVE
HDLC SERPL-MCNC: 42 MG/DL
HGB BLD-MCNC: 16.7 G/DL (ref 13.3–17.7)
HIV 1+2 AB+HIV1 P24 AG SERPL QL IA: NONREACTIVE
LDLC SERPL CALC-MCNC: 184 MG/DL
MCH RBC QN AUTO: 29 PG (ref 26.5–33)
MCHC RBC AUTO-ENTMCNC: 34.6 G/DL (ref 31.5–36.5)
MCV RBC AUTO: 84 FL (ref 78–100)
NONHDLC SERPL-MCNC: 210 MG/DL
PLATELET # BLD AUTO: 278 10E3/UL (ref 150–450)
POTASSIUM SERPL-SCNC: 4.2 MMOL/L (ref 3.4–5.3)
RBC # BLD AUTO: 5.76 10E6/UL (ref 4.4–5.9)
SODIUM SERPL-SCNC: 139 MMOL/L (ref 136–145)
TRIGL SERPL-MCNC: 129 MG/DL
WBC # BLD AUTO: 8.1 10E3/UL (ref 4–11)

## 2023-04-17 PROCEDURE — 36415 COLL VENOUS BLD VENIPUNCTURE: CPT | Performed by: INTERNAL MEDICINE

## 2023-04-17 PROCEDURE — 85027 COMPLETE CBC AUTOMATED: CPT | Performed by: INTERNAL MEDICINE

## 2023-04-17 PROCEDURE — 90715 TDAP VACCINE 7 YRS/> IM: CPT | Performed by: INTERNAL MEDICINE

## 2023-04-17 PROCEDURE — 86803 HEPATITIS C AB TEST: CPT | Performed by: INTERNAL MEDICINE

## 2023-04-17 PROCEDURE — 87389 HIV-1 AG W/HIV-1&-2 AB AG IA: CPT | Performed by: INTERNAL MEDICINE

## 2023-04-17 PROCEDURE — 90471 IMMUNIZATION ADMIN: CPT | Performed by: INTERNAL MEDICINE

## 2023-04-17 PROCEDURE — 80061 LIPID PANEL: CPT | Performed by: INTERNAL MEDICINE

## 2023-04-17 PROCEDURE — 99214 OFFICE O/P EST MOD 30 MIN: CPT | Mod: 25 | Performed by: INTERNAL MEDICINE

## 2023-04-17 PROCEDURE — 99395 PREV VISIT EST AGE 18-39: CPT | Mod: 25 | Performed by: INTERNAL MEDICINE

## 2023-04-17 PROCEDURE — 80048 BASIC METABOLIC PNL TOTAL CA: CPT | Performed by: INTERNAL MEDICINE

## 2023-04-17 ASSESSMENT — ENCOUNTER SYMPTOMS
SHORTNESS OF BREATH: 0
HEARTBURN: 0
CONSTIPATION: 0
ABDOMINAL PAIN: 0
HEMATOCHEZIA: 0
CHILLS: 0
EYE PAIN: 0
ARTHRALGIAS: 0
FREQUENCY: 0
NAUSEA: 0
HEMATURIA: 0
NERVOUS/ANXIOUS: 0
FEVER: 0
DYSURIA: 0
WEAKNESS: 0
DIARRHEA: 0
SORE THROAT: 0
JOINT SWELLING: 0
HEADACHES: 0
DIZZINESS: 0
MYALGIAS: 0
COUGH: 0
PALPITATIONS: 0
PARESTHESIAS: 0

## 2023-04-17 ASSESSMENT — PAIN SCALES - GENERAL: PAINLEVEL: NO PAIN (0)

## 2023-04-17 NOTE — PATIENT INSTRUCTIONS
"   You almost certainly have obstructive sleep apnea based on your description of symptoms.      Referral to sleep clinic to discuss probable sleep study to check for obstructive sleep apnea, and treat if present.     Return to see me in 2 year, sooner if needed.  Use [employer] or Call [phone] to schedule the appointment with me.           5 GOALS TO PREVENT VASCULAR DISEASE:     1.  Aggressive blood pressure control, under 130/80 ideally.  Using medications if needed.    Your blood pressure is under good control    BP Readings from Last 4 Encounters:   04/17/23 126/86   08/20/18 124/72   09/12/17 120/90   03/11/16 128/86       2.  Aggressive LDL cholesterol (\"bad cholesterol\") lowering as indicated.    Your goal is an LDL under 130 for sure, preferably under 100.  (If you have diabetes or previous vascular disease, the the LDL goals would be under 100 for sure, preferably under 70.)    New guidelines identify four high-risk groups who could benefit from statins:   *people with pre-existing heart disease, such as those who have had a heart attack;   *people ages 40 to 75 who have diabetes of any type  *patients ages 40 to 75 with at least a 7.5% risk of developing cardiovascular disease over the next decade, according to a formula described in the guidelines  *patients with the sort of super-high cholesterol that sometimes runs in families, as evidenced by an LDL of 190 milligrams per deciliter or higher    Your cholesterol levels are well controlled.    Recent Labs   Lab Test 08/21/18  1140   CHOL 180   HDL 41   *   TRIG 81       3.  Aggressive diabetic prevention, screening and/or management.      You do not have diabetes as of the most recent blood tests.     4.  No smoking    5.  Consider daily preventative aspirin over age 50.          Preventive Health Recommendations  Male Ages 26 - 39    Yearly exam:             See your health care provider every year in order to  o   Review health changes.   o   " "Discuss preventive care.    o   Review your medicines if your doctor has prescribed any.  You should be tested each year for STDs (sexually transmitted diseases), if you re at risk.   After age 35, talk to your provider about cholesterol testing. If you are at risk for heart disease, have your cholesterol tested at least every 5 years.   If you are at risk for diabetes, you should have a diabetes test (fasting glucose).  Shots: Get a flu shot each year. Get a tetanus shot every 10 years.     Nutrition:  Eat at least 5 servings of fruits and vegetables daily.   Eat whole-grain bread, whole-wheat pasta and brown rice instead of white grains and rice.   Get adequate Calcium and Vitamin D.        --Good Grains:  Oats, brown rice, Quinoa (these do not raise the blood sugar as much)     --Bad grains:  Anything made from wheat or white rice     (because these raise the blood sugars significantly, and the possible gluten issue from wheat for some people).      --Proteins:  Aim for \"lean proteins\" including chicken, fish, seafood, pork, turkey, and eggs (in moderation); Eat red meat only occasionally    Lifestyle  Exercise for at least 150 minutes a week (30 minutes a day, 5 days a week). This will help you control your weight and prevent disease.   Limit alcohol to one drink per day.   No smoking.   Wear sunscreen to prevent skin cancer.   See your dentist every six months for an exam and cleaning.         OBESITY/WEIGHT LOSS:     *  Obesity is a major problem in this country.  Over 2/3 of adult Americans are overweight (BMI Over 25), and 1/3 are obese (BMI over 30)    *  Obesity is the leading cause of diabetes type II, which currently affects 1 out of 10 adult Americans and is projected to potentially affect 1 out of 3 adult Americans by 2040    *  Chronic obesity leads to \"insulin resistance\" which affects the carbohydrate (sugar) metabolism causing \"diabetisy\" which leads to type II Diabetes, increased visceral fat, a " "chronic low grade inflammatory state that leads to higher rates of vascular disease among other things.     *  Obesity is defined as BMI (body mass index) greater than 30.    *  Morbid obesity is defined as BMI over 40    Your most recent Body mass index is:  Body mass index is 38.27 kg/m .    The main principles in weight loss are diet and exercise. You need to have both.      + Be physically active. Do activities that you enjoy, give you energy and are safe for you to do.Gradually build up the intensity (how hard your body is working) of activity. Long-term, aim for 10,000 steps OR 30 minutes or more of activity most days of the week.     +  It is very hard to lose weight with diet changes alone.  You need to have regular exercise.  You should aim for either 3 hours total per week total of cumulative physical aerobic activity or approximately 10,000 steps per day of activity.  Buy a pedometer or other fitness tracker and keep track of your activity.  If your typical daily activity does not add up to 10,000 total steps, then make up the difference with walking or some sort of aerobic activity.        + Eat \"real food\" (not processed), I.e. Never eat a single food item with more than 6 ingredients listed on the label.    +  Eat mostly vegetables, fruit, whole grains and lean proteins. That way, you--not food manufacturers--control the ingredients that go into your meals.    +  Keep your food shopping to the outside of the grocery store, do not eat foods that come from a bag or box, do not eat foods with bar codes.    + Aim for 5 servings of fruits and vegetables a day. Choose a variety of vegetables with different colors. Have fresh fruit for dessert. Limit  deep-fried vegetables, such as french fries.    + Choose lean protein, such as chicken or fish. Try non-meat sources of protein such as beans, soy and other legumes.    + Choose whole grains. Whole grain foods, such as whole-wheat bread, brown rice, barley, " "quinoa and oatmeal, contain the entire grain kernel and are better for your health. Limit refined grains, such as white bread and rice.    + Satisfy hunger with unsaturated fat. Fat helps you feel satisfied. Choose unsaturated fats, such as canola or olive oils, nuts and seeds, oil-based dressings and avocados. Limit saturated fats, which are found in animal products and some plant oils, such as coconut and palm oils.    +  Figure out what kind of calories you are taking in now at this time.  It is hard to change behaviors that you do not understand.      +  Keep your calorie intake at least less than 1400 per day to start (under 1200 total if you want to be more aggressive), divided between 3 smaller meals (try to have no meal more than 500 calories) and 2 snacks.      +  \"Learn what 500 calories looks like\" and try to keep all meals under 500 calories.      + Pay attention to portion sizes. Use smaller plates, bowls and glasses. Portion out foods before you eat.    +  Use the \"fork rule\" for all eating. [If you can't pick food up with a fork, then the food will not turn off hunger very well. Using this rule helps patients avoid choosing the wrong types of food, especially if you have had a bariatric surgery operation.   The \"wrong foods\" include liquid calories such as soup, juice, soda, slimfast, ice cream, and beer, crumbly foods such as chips, crackers, and cookies, and starch based foods such as pasta, too much rice, and too much bread.]     + Drink water or unsweetened beverages. Avoid soda, sweetened coffees and teas, energy drinks and sports drinks, which are full of added sugar that your body does not need. Water is always the best option.    +  Drink one large glass of water BEFORE each meal.  This not only helps guard against dehydration, but it also helps provide additional \"fullness\" that will help reduce the amount of food that you will consume in a given meal by helping you fill up earlier.      + " "Eat mindfully. Take time to fully enjoy your food and pay attention to what you are eating. Make meals last 15 to 30 minutes. This gives your body a chance to become satisfied and tell your brain to stop eating. Pay attention to what you are eating, rather than doing other activities such as watching TV or driving. This helps you pay attention to your body s signals of hunger and fullness.    + Share meals when eating at restaurants, or put half of the entrée in a to-go container before you start eating.    +  Try to eat breakfast every day.  Skipping meals has been shown to be one of the factors that correlates the highest with obesity, (even more than fast food).  Try to avoid the typical carbohydrates and sugars that dominate the typical American breakfast.  Try to get more protein in earlier in the day, this will make you less hungry later.       +  Try High Protein Ensure or Boost nutritional supplements (or similar versions) for breakfast if nothing else.      +  I NEVER recommend over the counter diet aids such as Metabolife or Dexatrim since they have a high risk of side effects mainly fast heart rate, insomnia, and nervousness.      Good resources for nutrition are:         --Good Grains:  Oats, brown rice, Quinoa (these do not raise the blood sugar as much)     --Bad grains:  Anything made from wheat or white rice     (because these raise the blood sugars significantly, and the possible gluten issue from wheat for some people).      --Proteins:  Aim for \"lean proteins\" including chicken, fish, seafood, pork, turkey, and eggs (in moderation); Eat red meat only occasionally      ---> \"Wheat Belly\" by Tim Saravia  (a book about the many bad things processed wheat products (i.e. Bread) have caused in our country...which I believe has serious merit)       --->  \"The Paleo Diet\"  By Francisca Hess.             --->\"In Defense of Food\"  Of \"Food Rules\" (Dario Ferro) a book that goes into the causes obesity " "epidemic in our country    Se Ferro:                    ---> \"Picture Perfect Weight Loss\" by Johnson Hightower (another good book about choices)        ---> \"Eat This, Not That\" Series,  by Lawson Hernandez  (a book about food choices in the modern world)              ---> \"The Blood Sugar Solution\" by Timothy Lagos ( a book about obesity and insulin resistance leading to \"diabesity\")           Melvina Herbie ( a guidebook for counting calories, and knowing the components of the food that you eat)       \"The Best Life Diet\"  (a complete diet program)             Aim for a Healthy Weight Web Page at www.nhlbi.nih.gov       [place] Diet       Henok Hussein:  \"Eat More, Weigh Less\" or \"The Program for the Reversal of Heart Disease\"       [facility] web site  the food and nutrition link.       American Heart Association       American Diabetes Association (www.diabetes.org)              Eat Better ? Move More ? Live Well     Eat 3 nutrient-rich meals each day    Don t skip meals--it will cause you to overeat later in the day!    Eating fiber (vegetables/fruits/whole grains) and protein with meals helps you stay full longer    Choose foods with less than 10 grams of sugar and 5 grams of fat per serving to prevent excess calories and weight re-gain  Eat around the same times each day to develop a routine eating schedule   Avoid snacking unless physically hungry.   Planned snacks: 1-2 times per day and no more than 150 calories    Eat protein first   Protein helps with healing, maintaining adequate muscle mass, reducing hunger and optimizing nutritional status   Aim for 60-80 grams of protein per day   Fill up on Fiber   Fiber comes from plants--fruits, veggies, whole grains, nuts/seeds and beans   Fiber is low in calories, high in phytonutrients and helps you stay full longer   Aim for 25-35 grams per day by eating fiber with meals and snacks  Eat [id number]   Take 20-30 minutes to eat each meal by taking small bites, " chewing foods to applesauce consistency or 20-30 times before you swallow   Eating foods too fast can delay satiety/fullness signals and increase overeating   Slow down your eating by using toddler utensils, putting your fork/spoon down between bites and not watching TV or emailing during meals!   Keep a Journal         Writing down what you eat, how you feel and when you are active helps you identify new changes to work on from week to week         Look for ways to cut 100 calories from your current diet 2-3 times per day  Drink 64 ounces of 0-Calorie drinks between meals   Water   Zero calorie Propel  or Vitamin Water     [employer]  Zero Calories   Crystal Light , Sugar-Free Norris-Aid , and other sugar-free lemonade or flavored mijares   Keep Caffeine to less than 300mg per day ie: 3-6oz cups coffee      Work up to 45-60 minutes of physical activity most days of the week   Helps with losing weight and prevent regaining those extra pounds!    Do a combo of cardio (walking/water exercises) and strength training (lifting weights/Vinyasa yoga)     Avoid Mindless Eating   Be present when you eat--take note of the smell, taste and quality of your food   Make a list of alternative activities you could do to prevent eating out of boredom/stress  Go for a walk, call a friend, chew gum, paint your nails, re-organize the garage, etc

## 2023-04-17 NOTE — PROGRESS NOTES
SUBJECTIVE:   CC: Timothy is an 37 year old who presents for preventative health visit.     Healthy Habits:     Getting at least 3 servings of Calcium per day:  Yes    Bi-annual eye exam:  Yes    Dental care twice a year:  Yes    Sleep apnea or symptoms of sleep apnea:  Excessive snoring    Diet:  Regular (no restrictions)    Frequency of exercise:  1 day/week    Duration of exercise:  Less than 15 minutes    Taking medications regularly:  Yes    Barriers to taking medications:  None    Medication side effects:  Not applicable    PHQ-2 Total Score: 0    Additional concerns today:  No    1.  History of psoriasis.  Followed by dermatology clinic.  Topical steroids did not seem to have much effect.  He has found the UV light therapy much more effective than gets this on a regular basis through the dermatology clinic.    2 history of gastroesophageal reflux with 2 episodes of esophageal stricture, last in 2014.  He has managed his reflux with diet predominately with no current symptoms.    3.  His wife reports that the patient snores regularly.  She is also reported that she has witnessed apnea in him as well.  He typically does not awaken feeling refreshed.  He wonders about sleep apnea.    4.    The patient has had a history of ongoing obesity.  Reviewed the weigth curves.   Their current BMI is:  Body mass index is 38.27 kg/m .  Reviewed previous attempts at weight loss which have not been successful in producing prolonged weigth loss.   Discussed current eating and exercise habits.     Reviewed weights in chart:  Wt Readings from Last 10 Encounters:   04/17/23 126.2 kg (278 lb 4.8 oz)   08/20/18 107.3 kg (236 lb 9.6 oz)   09/12/17 112.3 kg (247 lb 8 oz)   03/11/16 111.6 kg (246 lb 1.6 oz)   02/12/16 109.8 kg (242 lb)   03/18/15 108 kg (238 lb)   11/18/14 107.5 kg (237 lb)   08/08/13 107.5 kg (237 lb)   04/17/12 105.1 kg (231 lb 11.2 oz)   03/02/11 96.2 kg (212 lb)        Today's PHQ-2 Score:       4/16/2023    12:19 PM    PHQ-2 ( 1999 [employer])   Q1: Little interest or pleasure in doing things 0   Q2: Feeling down, depressed or hopeless 0   PHQ-2 Score 0   Q1: Little interest or pleasure in doing things Not at all    Not at all   Q2: Feeling down, depressed or hopeless Not at all    Not at all   PHQ-2 Score 0    0     Have you ever done Advance Care Planning? (For example, a Health Directive, POLST, or a discussion with a medical provider or your loved ones about your wishes): Yes, advance care planning is on file.    Social History     Tobacco Use     Smoking status: Never     Smokeless tobacco: Never   Vaping Use     Vaping status: Not on file   Substance Use Topics     Alcohol use: Yes     Comment: 5-6 per week           4/16/2023    12:19 PM   Alcohol Use   Prescreen: >3 drinks/day or >7 drinks/week? No       Last PSA: No results found for: PSA    Reviewed orders with patient. Reviewed health maintenance and updated orders accordingly - Yes      Reviewed and updated as needed this visit by clinical staff   Tobacco  Allergies  Meds   Med Hx  Surg Hx  Fam Hx          Reviewed and updated as needed this visit by Provider     Meds   Med Hx  Surg Hx  Fam Hx           **I reviewed the information recorded in the patient's EPIC chart (including but not limited to medical history, surgical history, family history, problem list, medication list, and allergy list) and updated the information as indicated based on the patients reported information.         Review of Systems   Constitutional: Negative for chills and fever.   HENT: Negative for congestion, ear pain, hearing loss and sore throat.    Eyes: Negative for pain and visual disturbance.   Respiratory: Negative for cough and shortness of breath.    Cardiovascular: Negative for chest pain, palpitations and peripheral edema.   Gastrointestinal: Negative for abdominal pain, constipation, diarrhea, heartburn, hematochezia and nausea.   Genitourinary: Negative for dysuria,  "frequency, genital sores, hematuria, impotence, penile discharge and urgency.   Musculoskeletal: Negative for arthralgias, joint swelling and myalgias.   Skin: Negative for rash.   Neurological: Negative for dizziness, weakness, headaches and paresthesias.   Psychiatric/Behavioral: Negative for mood changes. The patient is not nervous/anxious.      CONSTITUTIONAL: NEGATIVE for fever, chills, change in weight  INTEGUMENTARY/SKIN: NEGATIVE for worrisome rashes, moles or lesions  EYES: NEGATIVE for vision changes or irritation  ENT: NEGATIVE for ear, mouth and throat problems  RESP: NEGATIVE for significant cough or SOB  CV: NEGATIVE for chest pain, palpitations or peripheral edema  GI: NEGATIVE for nausea, abdominal pain, heartburn, or change in bowel habits   male: negative for dysuria, hematuria, decreased urinary stream, erectile dysfunction, urethral discharge  MUSCULOSKELETAL: NEGATIVE for significant arthralgias or myalgia  NEURO: NEGATIVE for weakness, dizziness or paresthesias  PSYCHIATRIC: NEGATIVE for changes in mood or affect    OBJECTIVE:   /86   Pulse 89   Temp 98.1  F (36.7  C) (Oral)   Ht 1.816 m (5' 11.5\")   Wt 126.2 kg (278 lb 4.8 oz)   SpO2 98%   BMI 38.27 kg/m      Physical Exam  GENERAL alert and no distress  EYES:  Normal sclera,conjunctiva, EOMI  HENT: oral and posterior pharynx without lesions or erythema, facies symmetric  NECK: Neck supple. No LAD, without thyroidmegaly.  RESP: Clear to ausculation bilaterally without wheezes or crackles. Normal BS in all fields.  CV: RRR normal S1S2 without murmurs, rubs or gallops.  LYMPH: no cervical lymph adenopathy appreciated  MS: extremities- no gross deformities of the visible extremities noted,   EXT:  no lower extremity edema  PSYCH: Alert and oriented times 3; speech- coherent  SKIN:  No obvious significant skin lesions on visible portions of face.  Healing psoriasis patches in both the upper arms.    Diagnostic Test Results:  Labs " reviewed in Epic    ASSESSMENT/PLAN:     (Z00.00) Routine general medical examination at a health care facility  (primary encounter diagnosis)  Comment: Discussed cardiac disease risk factor modification including screening, preventing, and treating hypertension, elevated lipids, diabetes, and smoking cessation.    Discussed age appropriate cancer screening recommendations as dictated by age group and past medical history.    Recommended making better food choices as often as possible, including lower carb, lower fat, lower salt diet and moderation in any alcohol intake.    Recommended maintaining regular physical activity/exercise throughout their lifetime.  Recommended safety and injury prevention (i.e. seatbelt use, safety equipment like helmets when biking, etc).       Plan: REVIEW OF HEALTH MAINTENANCE PROTOCOL ORDERS            (E66.01,  Z68.35) Severe obesity (BMI 35.0-35.9 with comorbidity) (H)  Comment: Obesity contributes to obstructive sleep apnea.  Current BMI is: Body mass index is 38.27 kg/m ..  Reviewed weight patterns.   Obesity as a biological preventable and treatable disease, which is associated with significantly increased risk of many acute and chronic health conditions.   Obesity has now been recognized as a chronic disease by the American Medical Association.  Obesity has serious co-morbidities associated with obesity including increased risk for hypertension, stroke, coronary artery disease, dyslipidemia, Type II diabetes, depression, sleep apnea, cancers of the colon, breast and endometrium, obstructive sleep apnea, osteoarthritis and female infertility.  Recommended regular aerobic exercise, recommended improved diet aiming at lowering amount of processed foods, lower sugars, moderation/reduction of simple carbs and fat in the diet, establishing more regular meal times, always eating breakfast, front loading some of the calories and adding more protein to the diet.        Plan: REVIEW OF  HEALTH MAINTENANCE PROTOCOL ORDERS,         CBC with platelets, Basic metabolic panel,         Lipid panel reflex to direct LDL Fasting            (G47.33) Obstructive sleep apnea of adult  Comment: Almost certainly has sleep apnea based on descriptions of his symptoms and in context with his elevated BMI.  Reviewed sleep apnea at length with the patient.  Referral to sleep clinic to consider testing for sleep apnea, and treatment if he has it.  Strongly recommended weight loss regardless (see above)  Plan: Adult Sleep Eval & Management          Referral            (K21.9) Gastroesophageal reflux disease without esophagitis  Comment: Stable just diet control alone.  Plan: REVIEW OF HEALTH MAINTENANCE PROTOCOL ORDERS,         CBC with platelets, Basic metabolic panel            (L40.9) Psoriasis  Comment: Known issue that I take into account for their medical decisions; no current exacerbations, or new concerns.  This condition is currently controlled on the current medical regimen.  Continue current therapy.   Plan: REVIEW OF HEALTH MAINTENANCE PROTOCOL ORDERS            (Z13.6) CARDIOVASCULAR SCREENING; LDL GOAL LESS THAN 160  Comment: Discussed cardiac disease risk factors and cardiac disease risk factor modification.   Plan: REVIEW OF HEALTH MAINTENANCE PROTOCOL ORDERS,         CBC with platelets, Basic metabolic panel,         Lipid panel reflex to direct LDL Fasting            (Z11.4) Screening for HIV (human immunodeficiency virus)  Comment: One time screening test per CDC recommendations.   Plan: REVIEW OF HEALTH MAINTENANCE PROTOCOL ORDERS,         HIV Antigen Antibody Combo            (Z11.59) Need for hepatitis C screening test  Comment: One time screening test per CDC recommendations.   Plan: REVIEW OF HEALTH MAINTENANCE PROTOCOL ORDERS,         Hepatitis C Screen Reflex to HCV RNA Quant and         Genotype            (Z23) Need for Tdap vaccination  Comment:   Plan: TDAP VACCINE (Adacel,  Boostrix)  [[id number]]               Patient has been advised of split billing requirements and indicates understanding: Yes      COUNSELING:   Reviewed preventive health counseling, as reflected in patient instructions       Regular exercise       Healthy diet/nutrition       Vision screening       Hearing screening       Immunizations    Vaccinated for: TDAP             Colorectal cancer screening discussion starting age 45 (no family history of colon cancer)       Prostate cancer screening starting late 40s        He reports that he has never smoked. He has never used smokeless tobacco.            Prakash Castellanos MD  [facility]

## 2023-08-07 ENCOUNTER — OFFICE VISIT (OUTPATIENT)
Dept: SLEEP MEDICINE | Facility: CLINIC | Age: 38
End: 2023-08-07
Attending: INTERNAL MEDICINE
Payer: COMMERCIAL

## 2023-08-07 VITALS
HEART RATE: 67 BPM | BODY MASS INDEX: 35.21 KG/M2 | SYSTOLIC BLOOD PRESSURE: 136 MMHG | WEIGHT: 260 LBS | DIASTOLIC BLOOD PRESSURE: 85 MMHG | HEIGHT: 72 IN | OXYGEN SATURATION: 95 %

## 2023-08-07 DIAGNOSIS — R53.82 CHRONIC FATIGUE: ICD-10-CM

## 2023-08-07 DIAGNOSIS — R29.818 SUSPECTED SLEEP APNEA: Primary | ICD-10-CM

## 2023-08-07 DIAGNOSIS — R06.81 WITNESSED APNEIC SPELLS: ICD-10-CM

## 2023-08-07 DIAGNOSIS — R06.83 SNORING: ICD-10-CM

## 2023-08-07 PROCEDURE — 99203 OFFICE O/P NEW LOW 30 MIN: CPT | Performed by: INTERNAL MEDICINE

## 2023-08-07 RX ORDER — ZOLPIDEM TARTRATE 5 MG/1
TABLET ORAL
Qty: 1 TABLET | Refills: 0 | Status: SHIPPED | OUTPATIENT
Start: 2023-08-07 | End: 2024-07-10

## 2023-08-07 ASSESSMENT — SLEEP AND FATIGUE QUESTIONNAIRES
HOW LIKELY ARE YOU TO NOD OFF OR FALL ASLEEP WHILE WATCHING TV: SLIGHT CHANCE OF DOZING
HOW LIKELY ARE YOU TO NOD OFF OR FALL ASLEEP IN A CAR, WHILE STOPPED FOR A FEW MINUTES IN TRAFFIC: WOULD NEVER DOZE
HOW LIKELY ARE YOU TO NOD OFF OR FALL ASLEEP WHEN YOU ARE A PASSENGER IN A CAR FOR AN HOUR WITHOUT A BREAK: WOULD NEVER DOZE
HOW LIKELY ARE YOU TO NOD OFF OR FALL ASLEEP WHILE SITTING INACTIVE IN A PUBLIC PLACE: WOULD NEVER DOZE
HOW LIKELY ARE YOU TO NOD OFF OR FALL ASLEEP WHILE LYING DOWN TO REST IN THE AFTERNOON WHEN CIRCUMSTANCES PERMIT: WOULD NEVER DOZE
HOW LIKELY ARE YOU TO NOD OFF OR FALL ASLEEP WHILE SITTING AND READING: MODERATE CHANCE OF DOZING
HOW LIKELY ARE YOU TO NOD OFF OR FALL ASLEEP WHILE SITTING AND TALKING TO SOMEONE: WOULD NEVER DOZE
HOW LIKELY ARE YOU TO NOD OFF OR FALL ASLEEP WHILE SITTING QUIETLY AFTER LUNCH WITHOUT ALCOHOL: WOULD NEVER DOZE

## 2023-08-07 NOTE — PROGRESS NOTES
Sleep Consultation:    Date on this visit: 8/7/2023    Timothy Castellanos  is referred by Prakash Castellanos for a sleep consultation.     Primary Physician: Prakash Castellanos     Timothy Castellanos reports nightly snoring, poor quality of sleep and daytime fatigue for many years.     Timothy does snore every night. Patient's bed partner does complain of snoring, snorting, and poor quality of sleep. He does have witnessed apneas.They never sleep separately.  Patient sleeps on his side. He denies no morning dry mouth, morning headaches, and restless legs.     Timothy has a delayed sleep phase circadian preference. He goes to sleep at 12:00 AM during the week. He wakes up at 7:00 AM. He falls asleep in 5 minutes.  Timothy denies difficulty falling asleep.  He wakes up 0 times a night.  On weekends, Timothy goes to sleep at 12:00 AM.  He wakes up at 8:00 AM. He falls asleep in 5 minutes.  Patient gets an average of 7 hours of sleep per night.     Timothy denies any bruxism, sleep walking, sleep talking, dream enactment, sleep paralysis, and hypnogogic/hypnopompic hallucinations.    Timothy has difficulty breathing through his nose.      Patient's [place] Sleepiness score 3/24 consistent with no daytime sleepiness.      Timothy naps 0 times per week. He takes some inadvertant naps.  He denies closing eyes, dozing, and falling asleep while driving. Patient was counseled on the importance of driving while alert, to pull over if drowsy, or nap before getting into the vehicle if sleepy.      He uses 2 cups/day of coffee before 9 AM and 1 cup of soda in the evening daily.     Problem List:  Patient Active Problem List    Diagnosis Date Noted    Severe obesity (BMI 35.0-35.9 with comorbidity) (H)      Priority: Medium     2018 BMI 33  4/17/2023 BMI 38.27      CARDIOVASCULAR SCREENING; LDL GOAL LESS THAN 160 03/02/2011     Priority: Medium    GERD (gastroesophageal reflux disease)      Priority: Medium    Psoriasis      Priority: Medium        Past  Medical/Surgical History:  Past Medical History:   Diagnosis Date    C. difficile diarrhea 02/01/2016    Esophageal stricture 2001, 2007    esophageal strictures dilated x 2    GERD (gastroesophageal reflux disease)     Obesity (BMI 30-39.9)     BMI 33    Psoriasis      Physical Examination:  Vitals: Ht 1.829 m (6')   Wt 117.9 kg (260 lb)   BMI 35.26 kg/m    BMI= Body mass index is 35.26 kg/m .  GENERAL APPEARANCE: healthy, alert, and no distress  HENT: oropharynx crowded and tongue base enlarged  NEURO: Normal strength and tone, mentation intact, and speech normal  PSYCH: mentation appears normal and affect normal/bright  Mallampati Class: IV.  Tonsillar Stage: 1  hidden by pillars.    Impression/Plan:    Probable obstructive sleep apnea    Patient is a 37 years old male, BMI of 35, with no significant medical comorbidity, who presents with a history of loud snoring, witnessed apneas, nonrestorative sleep and daytime fatigue.  Oropharynx is Mallampati class IV on examination.  There is an intermediate to high risk for obstructive sleep apnea, and an overnight sleep study is recommended for further evaluation.    Plan:     PSG for assessment of sleep apnea      He will follow up with me in approximately two weeks after his sleep study has been competed to review the results and discuss plan of care.       Polysomnography & HST reviewed.  Obstructive sleep apnea reviewed.  Complications of untreated sleep apnea were reviewed.    Dr. Donta Sahni       CC: Prakash Castellanos

## 2023-08-07 NOTE — NURSING NOTE
Chief Complaint   Patient presents with    Sleep Problem     Snoring/ Stop breathing in sleep       Initial /85   Pulse 67   Ht 1.829 m (6')   Wt 117.9 kg (260 lb)   SpO2 95%   BMI 35.26 kg/m   Estimated body mass index is 35.26 kg/m  as calculated from the following:    Height as of this encounter: 1.829 m (6').    Weight as of this encounter: 117.9 kg (260 lb).    Medication Reconciliation: complete  ESS 3  Neck circumference: 46 centimeters.  Mari Lobo MA

## 2023-08-13 ENCOUNTER — THERAPY VISIT (OUTPATIENT)
Dept: SLEEP MEDICINE | Facility: CLINIC | Age: 38
End: 2023-08-13
Payer: COMMERCIAL

## 2023-08-13 DIAGNOSIS — R29.818 SUSPECTED SLEEP APNEA: ICD-10-CM

## 2023-08-13 DIAGNOSIS — R06.81 WITNESSED APNEIC SPELLS: ICD-10-CM

## 2023-08-13 DIAGNOSIS — R53.82 CHRONIC FATIGUE: ICD-10-CM

## 2023-08-13 DIAGNOSIS — R06.83 SNORING: ICD-10-CM

## 2023-08-13 PROCEDURE — 95810 POLYSOM 6/> YRS 4/> PARAM: CPT | Performed by: INTERNAL MEDICINE

## 2023-08-13 ASSESSMENT — SLEEP AND FATIGUE QUESTIONNAIRES
HOW LIKELY ARE YOU TO NOD OFF OR FALL ASLEEP WHILE SITTING INACTIVE IN A PUBLIC PLACE: WOULD NEVER DOZE
HOW LIKELY ARE YOU TO NOD OFF OR FALL ASLEEP WHILE WATCHING TV: MODERATE CHANCE OF DOZING
HOW LIKELY ARE YOU TO NOD OFF OR FALL ASLEEP WHILE LYING DOWN TO REST IN THE AFTERNOON WHEN CIRCUMSTANCES PERMIT: SLIGHT CHANCE OF DOZING
HOW LIKELY ARE YOU TO NOD OFF OR FALL ASLEEP WHILE SITTING AND TALKING TO SOMEONE: WOULD NEVER DOZE
HOW LIKELY ARE YOU TO NOD OFF OR FALL ASLEEP WHILE SITTING AND READING: SLIGHT CHANCE OF DOZING
HOW LIKELY ARE YOU TO NOD OFF OR FALL ASLEEP WHILE SITTING QUIETLY AFTER LUNCH WITHOUT ALCOHOL: WOULD NEVER DOZE
HOW LIKELY ARE YOU TO NOD OFF OR FALL ASLEEP WHEN YOU ARE A PASSENGER IN A CAR FOR AN HOUR WITHOUT A BREAK: WOULD NEVER DOZE
HOW LIKELY ARE YOU TO NOD OFF OR FALL ASLEEP IN A CAR, WHILE STOPPED FOR A FEW MINUTES IN TRAFFIC: WOULD NEVER DOZE

## 2023-08-17 NOTE — PROCEDURES
SLEEP STUDY INTERPRETATION  DIAGNOSTIC POLYSOMNOGRAPHY REPORT      Patient: AN CASTELLANOS  YOB: 1985  Study Date: 8/13/2023  MRN: [id number]  Referring Provider: MD Colin Castellanos  Ordering Provider: MD Donta Sahni    Indications for Polysomnography: The patient is a 37 year old Male who is 6' and weighs 260.0 lbs. His BMI is 35.6, [place] sleepiness scale 3/24 and neck circumference is 46 cm. A diagnostic polysomnogram was performed to evaluate for sleep apnea.    Polysomnogram Data: A full night polysomnogram recorded the standard physiologic parameters including EEG, EOG, EMG, ECG, nasal and oral airflow. Respiratory parameters of chest and abdominal movements were recorded with respiratory inductance plethysmography. Oxygen saturation was recorded by pulse oximetry. Hypopnea scoring rule used: 1B 4%.    Sleep Architecture: Significantly reduced sleep efficiency with total sleep time of only 160 minutes.   The total recording time of the polysomnogram was 420.5 minutes. The total sleep time was 160.5 minutes. Sleep latency was increased at 186.0 minutes without the use of a sleep aid. REM latency was 187.0 minutes. Arousal index was increased at 27.3 arousals per hour. Sleep efficiency was decreased at 38.2%. Wake after sleep onset was 74.0 minutes. The patient spent 26.5% of total sleep time in Stage N1, 43.3% in Stage N2, 18.1% in Stage N3, and 12.1% in REM. Time in REM supine was - minutes.    Respiration: Negative for sleep apnea.   Events ? The polysomnogram revealed a presence of - obstructive, - central, and - mixed apneas resulting in an apnea index of - events per hour. There were - obstructive hypopneas and - central hypopneas resulting in an obstructive hypopnea index of - and central hypopnea index of - events per hour. The combined apnea/hypopnea index was - events per hour (central apnea/hypopnea index was - events per hour). The REM AHI was - events per hour. The supine AHI  was - events per hour. The RERA index was 1.1 events per hour.  The RDI was 1.1 events per hour.  Snoring - was reported as mild.  Respiratory rate and pattern - was notable for normal respiratory rate and pattern.  Sustained Sleep Associated Hypoventilation - Transcutaneous carbon dioxide monitoring was not used; however significant hypoventilation was not suggested by oximetry.  Sleep Associated Hypoxemia - (Greater than 5 minutes O2 sat at or below 88%) was not present. Baseline oxygen saturation was 95.9%. Lowest oxygen saturation was 93.0%. Time spent less than or equal to 88% was 0 minutes. Time spent less than or equal to 89% was 0 minutes.    Movement Activity: Negative for significant movement abnormalities.   Periodic Limb Activity - There were 21 PLMs during the entire study. The PLM index was 7.9 movements per hour. The PLM Arousal Index was - per hour.  REM EMG Activity - Excessive transient/sustained muscle activity was not present.  Nocturnal Behavior - Abnormal sleep related behaviors were not noted during/arising out of NREM / REM sleep.   Bruxism - None apparent.    Cardiac Summary: Sinus rhythm.   The average pulse rate was 64.6 bpm. The minimum pulse rate was 47.0 bpm while the maximum pulse rate was 100.0 bpm.  Arrhythmias were not noted.    Assessment:   This sleep study is a limited evaluation due to low sleep efficiency and total sleep time of only 160 minutes. There was no supine sleep during the test. There was no evidence of sleep apnea during available sleep time including during lateral REM.     Recommendations:  Consider repeat testing with use of a hypnotic for assessment of sleep disordered breathing.    Diagnostic Codes:   Repetitive Intrusions Into Sleep F51.8    8/13/2023 [facility] (260.0 lbs) - AHI -, RDI 1.1, Supine AHI -, REM AHI -, Low O2 93.0%, Time Spent ?88% 0 minutes / Time Spent ?89% 0 minutes.     _____________________________________    Electronically Signed By: Donta Sahni MD 08/17/2023

## 2023-08-18 LAB — SLPCOMP: NORMAL

## 2024-03-18 ENCOUNTER — PATIENT OUTREACH (OUTPATIENT)
Dept: CARE COORDINATION | Facility: CLINIC | Age: 39
End: 2024-03-18
Payer: COMMERCIAL

## 2024-04-01 ENCOUNTER — PATIENT OUTREACH (OUTPATIENT)
Dept: CARE COORDINATION | Facility: CLINIC | Age: 39
End: 2024-04-01
Payer: COMMERCIAL

## 2024-07-04 ASSESSMENT — SLEEP AND FATIGUE QUESTIONNAIRES
HOW LIKELY ARE YOU TO NOD OFF OR FALL ASLEEP WHILE WATCHING TV: SLIGHT CHANCE OF DOZING
HOW LIKELY ARE YOU TO NOD OFF OR FALL ASLEEP IN A CAR, WHILE STOPPED FOR A FEW MINUTES IN TRAFFIC: WOULD NEVER DOZE
HOW LIKELY ARE YOU TO NOD OFF OR FALL ASLEEP WHILE LYING DOWN TO REST IN THE AFTERNOON WHEN CIRCUMSTANCES PERMIT: SLIGHT CHANCE OF DOZING
HOW LIKELY ARE YOU TO NOD OFF OR FALL ASLEEP WHILE SITTING INACTIVE IN A PUBLIC PLACE: WOULD NEVER DOZE
HOW LIKELY ARE YOU TO NOD OFF OR FALL ASLEEP WHILE SITTING QUIETLY AFTER LUNCH WITHOUT ALCOHOL: WOULD NEVER DOZE
HOW LIKELY ARE YOU TO NOD OFF OR FALL ASLEEP WHILE SITTING AND READING: MODERATE CHANCE OF DOZING
HOW LIKELY ARE YOU TO NOD OFF OR FALL ASLEEP WHEN YOU ARE A PASSENGER IN A CAR FOR AN HOUR WITHOUT A BREAK: WOULD NEVER DOZE
HOW LIKELY ARE YOU TO NOD OFF OR FALL ASLEEP WHILE SITTING AND TALKING TO SOMEONE: WOULD NEVER DOZE

## 2024-07-06 ENCOUNTER — HEALTH MAINTENANCE LETTER (OUTPATIENT)
Age: 39
End: 2024-07-06

## 2024-07-10 ENCOUNTER — OFFICE VISIT (OUTPATIENT)
Dept: SLEEP MEDICINE | Facility: CLINIC | Age: 39
End: 2024-07-10
Payer: COMMERCIAL

## 2024-07-10 VITALS
BODY MASS INDEX: 36.7 KG/M2 | DIASTOLIC BLOOD PRESSURE: 84 MMHG | SYSTOLIC BLOOD PRESSURE: 126 MMHG | HEART RATE: 75 BPM | WEIGHT: 271 LBS | HEIGHT: 72 IN | OXYGEN SATURATION: 95 %

## 2024-07-10 DIAGNOSIS — R29.818 SUSPECTED SLEEP APNEA: Primary | ICD-10-CM

## 2024-07-10 DIAGNOSIS — R53.82 CHRONIC FATIGUE: ICD-10-CM

## 2024-07-10 DIAGNOSIS — R06.83 SNORING: ICD-10-CM

## 2024-07-10 DIAGNOSIS — R06.81 WITNESSED APNEIC SPELLS: ICD-10-CM

## 2024-07-10 PROCEDURE — 99213 OFFICE O/P EST LOW 20 MIN: CPT | Performed by: INTERNAL MEDICINE

## 2024-07-10 RX ORDER — ZOLPIDEM TARTRATE 5 MG/1
TABLET ORAL
Qty: 1 TABLET | Refills: 0 | Status: SHIPPED | OUTPATIENT
Start: 2024-07-10

## 2024-07-10 NOTE — PROGRESS NOTES
Sleep Study Follow-Up Visit:    Date on this visit: 7/10/2024    Timothy Castellanos comes in today for follow-up of his sleep study done on 8/13/2023 at the [facility] for possible sleep apnea.    Sleep latency 186 minutes without Ambien.  REM achieved.   REM latency 187 minutes.  Sleep efficiency 38%. Total sleep time 160.5 minutes.    Sleep architecture:  Stage 1, 26.5% (5%), stage 2, 43.3% (45-55%), stage 3, 18% (15-20%), stage REM, 12.1% (20-25%).  AHI was 0, without significant desaturations. RDI 1.1.  REM AHI 0, consistent with no REM PAYTON.  Supine AHI 0, consistent with no SUPINE PAYTON.  Periodic Limb Movement Index 7.9/hour.       This sleep study is a limited evaluation due to low sleep efficiency and total sleep time of only 160 minutes.     Patient continues to snore loudly. Patient's wife does complain of snoring and gasping. He does have witnessed apneas.They never sleep separately.  Patient sleeps on his side.     These findings were reviewed with patient.     Past medical/surgical history, family history, social history, medications and allergies were reviewed.      Problem List:  Patient Active Problem List    Diagnosis Date Noted    Severe obesity (BMI 35.0-35.9 with comorbidity) (H)      Priority: Medium     2018 BMI 33  4/17/2023 BMI 38.27      CARDIOVASCULAR SCREENING; LDL GOAL LESS THAN 160 03/02/2011     Priority: Medium    GERD (gastroesophageal reflux disease)      Priority: Medium    Psoriasis      Priority: Medium        Impression/Plan:    Suspected sleep apnea  Chronic fatigue     This sleep study is a limited evaluation due to low sleep efficiency and total sleep time of only 160 minutes. Supine REM was not obtained and patient reports that he does not normally sleep on his back. He continues to have loud snoring, witnessed apneas, non restorative sleep and daytime fatigue.     Plan:     Home sleep apnea testing   Follow up after testing     I spent a total of 15 minutes for this  appointment on this date of service which include time spent before, during and after the visit for chart review, patient care, counseling and coordination of care.    Dr. Donta Sahni    CC: Prakash Castellanos

## 2024-09-09 ENCOUNTER — OFFICE VISIT (OUTPATIENT)
Dept: SLEEP MEDICINE | Facility: CLINIC | Age: 39
End: 2024-09-09
Payer: COMMERCIAL

## 2024-09-09 DIAGNOSIS — R29.818 SUSPECTED SLEEP APNEA: ICD-10-CM

## 2024-09-09 DIAGNOSIS — R06.83 SNORING: ICD-10-CM

## 2024-09-09 DIAGNOSIS — R06.81 WITNESSED APNEIC SPELLS: ICD-10-CM

## 2024-09-09 DIAGNOSIS — R53.82 CHRONIC FATIGUE: ICD-10-CM

## 2024-09-09 PROCEDURE — G0399 HOME SLEEP TEST/TYPE 3 PORTA: HCPCS | Performed by: INTERNAL MEDICINE

## 2024-09-09 NOTE — PROGRESS NOTES
Pt is completing a home sleep test. Pt was instructed on how to put on the Noxturnal T3 device and associated equipment before going to bed and given the opportunity to practice putting it on before leaving the sleep center. Pt was reminded to bring the home sleep test kit back to the center tomorrow, at the scheduled time for download and reporting. Patient was instructed to complete study using the following treatment?  None  Neck circumference: 46 CM / 18 inches.  Device number: 27  Sylvia Ornelas CMA on 9/9/2024 at 11:17 AM

## 2024-09-10 ENCOUNTER — DOCUMENTATION ONLY (OUTPATIENT)
Dept: SLEEP MEDICINE | Facility: CLINIC | Age: 39
End: 2024-09-10
Payer: COMMERCIAL

## 2024-09-10 NOTE — NURSING NOTE
Pt returned HST device. It was downloaded and forwarded data to the clinical specialist for scoring.   Sylvia Ornelas CMA on 9/10/2024 at 8:57 AM

## 2024-09-10 NOTE — PROGRESS NOTES
HST POST-STUDY QUESTIONNAIRE    What time did you go to bed?  11:00 pm  How long do you think it took to fall asleep?  20 minutes  What time did you wake up to start the day?  6:00 am  Did you get up during the night at all?  No  If you woke up, do you remember approximately what time(s)?   Did you have any difficulty with the equipment?  No  Did you us any type of treatment with this study?  None  Was the head of the bed elevated? No  Did you sleep in a recliner?  No  Did you stop using CPAP at least 3 days before this test?  NA  Any other information you'd like us to know?

## 2024-09-18 NOTE — PROCEDURES
HOME SLEEP STUDY INTERPRETATION        Patient: Timothy Castellanos  MRN: [id number]  YOB: 1985  Study Date: 9/9/2024  PCP/Referring Provider: Prakash Castellanos;   Ordering Provider: Donta Sahni MD       Indications for Home Study: Timothy Castellanos is a 38 year old male who presents with symptoms suggestive of obstructive sleep apnea.    Estimated body mass index is 36.75 kg/m  as calculated from the following:    Height as of 7/10/24: 1.829 m (6').    Weight as of 7/10/24: 122.9 kg (271 lb).  Total score - [place]: 4 (7/4/2024 12:48 PM)      Data: A full night home sleep study was performed recording the standard physiologic parameters including body position, movement, sound, nasal pressure, thermal oral airflow, chest and abdominal movements with respiratory inductance plethysmography, and oxygen saturation by pulse oximetry. Pulse rate was estimated by oximetry recording. This study was considered adequate based on > 4 hours of quality oximetry and respiratory recording. As specified by the AASM Manual for the Scoring of Sleep and Associated events, version 2.3, Rule VIII.D 1B, 4% oxygen desaturation scoring for hypopneas is used as a standard of care on all home sleep apnea testing.        Analysis Time:  445.3 minutes        Respiration:   Sleep Associated Hypoxemia: sustained hypoxemia was not present. Baseline oxygen saturation was 95%.  Time with saturation less than or equal to 88% was 0.9 minutes. The lowest oxygen saturation was 86%.   Snoring: Snoring was present.  Respiratory events: The home study revealed a presence of 14 obstructive apneas and 1 mixed and central apneas. There were 22 hypopneas resulting in a combined apnea/hypopnea index [AHI] of 5 events per hour.  AHI was 16.9 per hour supine, N/A per hour prone, 2.2 per hour on left side, and 5.7 per hour on right side.   Pattern: Excluding events noted above, respiratory rate and pattern was Normal.      Position: Percent of time  spent: supine - 6.4%, prone - 0%, on left - 42%, on right - 51.6%.      Heart Rate: By pulse oximetry normal rate was noted.       Assessment:   Mild obstructive sleep apnea.  Sleep associated hypoxemia was not present.    Recommendations:  If treatment of mild obstructive sleep apnea is clinically indicated, following therapy options can be considered.  Patient can consider mandibular advancement dental appliance therapy through referral to dental sleep medicine.  If there is excessive daytime sleepiness, auto titrating CPAP therapy can be a consideration.  Suggest optimizing sleep hygiene and avoiding sleep deprivation.  Weight management.        Diagnosis Code(s): Obstructive Sleep Apnea G47.33    Electronically signed by: Donta Sahni MD, September 18, 2024   Diplomate, American Board of Psychiatry and Neurology, Sleep Medicine

## 2024-09-18 NOTE — PROGRESS NOTES
This HSAT was performed using a Noxturnal T3 device which recorded snore, sound, movement activity, body position, nasal pressure, oronasal thermal airflow, pulse, oximetry and both chest and abdominal respiratory effort. HSAT data was restricted to the time patient states they were in bed.     HSAT was scored using 1B 4% hypopnea rule.     HST AHI (Non-PAT): 5  Snoring was reported as moderate and loud.  Time with SpO2 below 89% was 0.9 minutes.   Overall signal quality was good     Pt will follow up with sleep provider to determine appropriate therapy.

## 2024-10-14 ENCOUNTER — PATIENT OUTREACH (OUTPATIENT)
Dept: CARE COORDINATION | Facility: CLINIC | Age: 39
End: 2024-10-14
Payer: COMMERCIAL

## 2024-12-30 SDOH — HEALTH STABILITY: PHYSICAL HEALTH: ON AVERAGE, HOW MANY MINUTES DO YOU ENGAGE IN EXERCISE AT THIS LEVEL?: 10 MIN

## 2024-12-30 SDOH — HEALTH STABILITY: PHYSICAL HEALTH: ON AVERAGE, HOW MANY DAYS PER WEEK DO YOU ENGAGE IN MODERATE TO STRENUOUS EXERCISE (LIKE A BRISK WALK)?: 1 DAY

## 2024-12-30 ASSESSMENT — SOCIAL DETERMINANTS OF HEALTH (SDOH): HOW OFTEN DO YOU GET TOGETHER WITH FRIENDS OR RELATIVES?: ONCE A WEEK

## 2025-01-02 ENCOUNTER — OFFICE VISIT (OUTPATIENT)
Dept: INTERNAL MEDICINE | Facility: CLINIC | Age: 40
End: 2025-01-02
Payer: COMMERCIAL

## 2025-01-02 VITALS
RESPIRATION RATE: 16 BRPM | HEART RATE: 76 BPM | SYSTOLIC BLOOD PRESSURE: 126 MMHG | WEIGHT: 266.1 LBS | HEIGHT: 72 IN | DIASTOLIC BLOOD PRESSURE: 84 MMHG | OXYGEN SATURATION: 99 % | BODY MASS INDEX: 36.04 KG/M2 | TEMPERATURE: 97.6 F

## 2025-01-02 DIAGNOSIS — E66.01 SEVERE OBESITY (BMI 35.0-35.9 WITH COMORBIDITY) (H): ICD-10-CM

## 2025-01-02 DIAGNOSIS — Z23 NEED FOR INFLUENZA VACCINATION: ICD-10-CM

## 2025-01-02 DIAGNOSIS — R06.83 SNORING: ICD-10-CM

## 2025-01-02 DIAGNOSIS — E78.5 HYPERLIPIDEMIA LDL GOAL <130: ICD-10-CM

## 2025-01-02 DIAGNOSIS — Z00.01 ENCOUNTER FOR ROUTINE ADULT MEDICAL EXAM WITH ABNORMAL FINDINGS: Primary | ICD-10-CM

## 2025-01-02 DIAGNOSIS — K21.9 GASTROESOPHAGEAL REFLUX DISEASE WITHOUT ESOPHAGITIS: ICD-10-CM

## 2025-01-02 DIAGNOSIS — Z13.6 CARDIOVASCULAR SCREENING; LDL GOAL LESS THAN 130: ICD-10-CM

## 2025-01-02 PROBLEM — H52.213 IRREGULAR ASTIGMATISM, BILATERAL: Status: ACTIVE | Noted: 2023-11-01

## 2025-01-02 PROBLEM — H52.13 MYOPIA, BILATERAL: Status: ACTIVE | Noted: 2023-11-01

## 2025-01-02 LAB
ANION GAP SERPL CALCULATED.3IONS-SCNC: 9 MMOL/L (ref 7–15)
BUN SERPL-MCNC: 14.3 MG/DL (ref 6–20)
CALCIUM SERPL-MCNC: 9.2 MG/DL (ref 8.8–10.4)
CHLORIDE SERPL-SCNC: 104 MMOL/L (ref 98–107)
CHOLEST SERPL-MCNC: 263 MG/DL
CREAT SERPL-MCNC: 1.01 MG/DL (ref 0.67–1.17)
EGFRCR SERPLBLD CKD-EPI 2021: >90 ML/MIN/1.73M2
ERYTHROCYTE [DISTWIDTH] IN BLOOD BY AUTOMATED COUNT: 11.8 % (ref 10–15)
FASTING STATUS PATIENT QL REPORTED: YES
FASTING STATUS PATIENT QL REPORTED: YES
GLUCOSE SERPL-MCNC: 93 MG/DL (ref 70–99)
HCO3 SERPL-SCNC: 28 MMOL/L (ref 22–29)
HCT VFR BLD AUTO: 48.1 % (ref 40–53)
HDLC SERPL-MCNC: 46 MG/DL
HGB BLD-MCNC: 16.8 G/DL (ref 13.3–17.7)
LDLC SERPL CALC-MCNC: 198 MG/DL
MCH RBC QN AUTO: 29.2 PG (ref 26.5–33)
MCHC RBC AUTO-ENTMCNC: 34.9 G/DL (ref 31.5–36.5)
MCV RBC AUTO: 84 FL (ref 78–100)
NONHDLC SERPL-MCNC: 217 MG/DL
PLATELET # BLD AUTO: 294 10E3/UL (ref 150–450)
POTASSIUM SERPL-SCNC: 4.1 MMOL/L (ref 3.4–5.3)
RBC # BLD AUTO: 5.76 10E6/UL (ref 4.4–5.9)
SODIUM SERPL-SCNC: 141 MMOL/L (ref 135–145)
TRIGL SERPL-MCNC: 97 MG/DL
WBC # BLD AUTO: 8.3 10E3/UL (ref 4–11)

## 2025-01-02 ASSESSMENT — PAIN SCALES - GENERAL: PAINLEVEL_OUTOF10: NO PAIN (0)

## 2025-01-02 NOTE — PATIENT INSTRUCTIONS
"      5 GOALS TO PREVENT VASCULAR DISEASE:     1.  Aggressive blood pressure control, under 130/80 ideally.  Using medications if needed.    Your blood pressure is under good control    BP Readings from Last 4 Encounters:   01/02/25 126/84   07/10/24 126/84   08/07/23 136/85   04/17/23 126/86       2.  Aggressive LDL cholesterol (\"bad cholesterol\") lowering as indicated.    Your goal is an LDL under 130 for sure, preferably under 100.  (If you have diabetes or previous vascular disease, the the LDL goals would be under 100 for sure, preferably under 70.)    New guidelines identify four high-risk groups who could benefit from statins:   *people with pre-existing heart disease, such as those who have had a heart attack;   *people ages 40 to 75 who have diabetes of any type  *patients ages 40 to 75 with at least a 7.5% risk of developing cardiovascular disease over the next decade, according to a formula described in the guidelines  *patients with the sort of super-high cholesterol that sometimes runs in families, as evidenced by an LDL of 190 milligrams per deciliter or higher    Your cholesterol levels require  nothing more than diet at this time.   If the.    Recent Labs   Lab Test 04/17/23  1052 08/21/18  1140   CHOL 252* 180   HDL 42 41   * 123*   TRIG 129 81       --LDL (\"bad\" cholesterol): normal under 130, ideal under 100.  Best way to lower this is through better food choices ( lower fats, etc.), medication may be considered if indicated  --HDL (\"good\") cholesterol: (normal above 40 for men, above 50 for women).  Best way to improve the HDL level is through regular physical exercise.  There are no medications to raise HDL levels.   --Triglycerides (desirable under 150): triglycerides are more about metabolic issues, best way to improve this is through better diet choices, emphasizing reducing the intake of \"simple carbohydrates\" (e.g. White bread, white rice, pasta, noodles, potatoes, snack foods, " regular soda, juices (except fresh squeezed), cakes, cookies, candy, etc.) as best possible. Medications may be considered for triglyceride levels routinely above 400.    3.  Aggressive diabetic prevention, screening and/or management.      You do not have diabetes as of the most recent blood tests.     4.  No smoking    5.  Consider daily preventative aspirin over age 50 if you have enough cardiac risk factors to place you at higher risk for the presence of vascular disease.    If you have any reason not to take aspirin such easy bruising or bleeding, stomach problems, other anticoagulant medications, or any other side effects, then you should not take Aspirin.     --Based on your current cardiac disease risk profile and/or age over 75, you do NOT need to take daily preventative aspirin.        Preventive Health Recommendations  Male Ages 26 - 45    Yearly exam:             See your health care provider every year in order to:  Review health changes in your medical history or your family medical history  Review and reassess any ongoing chronic medical conditions  Discuss preventive care.    Identify and aggressively manage any vascular disease risk factors (including blood pressure, cholesterol, diabetes)  Review and renew any prescription medications if you take prescription medications.  Consider STD testing if you are at risk.  Cholesterol testing starting at age 25. If you are at risk for heart disease, have your cholesterol tested at least every 5 years.   Diabetes screening every 3 years, if you are at risk for diabetes, then diabetic screening should be done annually.  Colon cancer screening normally begins at age 45, but starting at age 35 if you have a family history of colon cancer below the age of 50.    Shots:   Get the annual influenza vaccine (flu shot) each fall.   Get a tetanus shot every 10 years.   Covid vaccines are now recommended annually.  Get the most updated Covid vaccine when it becomes  "available, consider getting this at the same time as the annual influenza vaccine.    Nutrition:  Eat at least 5 servings of fruits and vegetables daily.   Eat whole-grain bread, whole-wheat pasta and brown rice instead of white grains and rice.   Talk to your provider about Calcium and Vitamin D.        --Good Grains:  Oats, brown rice, Quinoa (these do not raise the blood sugar as much)     --Bad grains:  Anything made from wheat or white rice     (because these raise the blood sugars significantly, and the possible gluten issue from wheat for some people).      --Proteins:  Aim for \"lean proteins\" including chicken, fish, seafood, pork, turkey, and eggs (in moderation); Eat red meat only occasionally    [place]:                              Lifestyle  Exercise for at least 150 minutes a week (30 minutes a day, 5 days a week). This will help you control your weight and prevent disease.   Limit alcohol to one drink per day.   Always use condoms for STD prevention, until you are in a committed monogamous relationship  No smoking.   Wear sunscreen to prevent skin cancer.   See your dentist every six months for an exam and cleaning.       "

## 2025-03-24 ENCOUNTER — MYC REFILL (OUTPATIENT)
Dept: INTERNAL MEDICINE | Facility: CLINIC | Age: 40
End: 2025-03-24
Payer: COMMERCIAL

## 2025-03-24 DIAGNOSIS — E78.5 HYPERLIPIDEMIA LDL GOAL <130: ICD-10-CM

## 2025-03-25 RX ORDER — ROSUVASTATIN CALCIUM 20 MG/1
20 TABLET, COATED ORAL DAILY
Qty: 90 TABLET | Refills: 2 | Status: SHIPPED | OUTPATIENT
Start: 2025-03-25